# Patient Record
Sex: MALE | Race: WHITE | Employment: OTHER | URBAN - METROPOLITAN AREA
[De-identification: names, ages, dates, MRNs, and addresses within clinical notes are randomized per-mention and may not be internally consistent; named-entity substitution may affect disease eponyms.]

---

## 2017-11-26 ENCOUNTER — HOSPITAL ENCOUNTER (INPATIENT)
Age: 47
LOS: 3 days | Discharge: HOME | DRG: 885 | End: 2017-11-29
Attending: EMERGENCY MEDICINE | Admitting: PSYCHIATRY & NEUROLOGY
Payer: MEDICARE

## 2017-11-26 DIAGNOSIS — F30.9 MANIA (HCC): Primary | ICD-10-CM

## 2017-11-26 PROBLEM — F33.9 DEPRESSION, MAJOR, RECURRENT (HCC): Status: ACTIVE | Noted: 2017-11-26

## 2017-11-26 LAB
AMPHETAMINE SCREEN URINE: NEGATIVE
BARBITURATE SCREEN URINE: NEGATIVE
BENZODIAZEPINE SCREEN, URINE: NEGATIVE
BILIRUBIN URINE: NEGATIVE
BUPRENORPHINE URINE: NORMAL
CANNABINOID SCREEN URINE: NEGATIVE
CHP ED QC CHECK: YES
COCAINE METABOLITE, URINE: NEGATIVE
COLOR: YELLOW
COMMENT UA: ABNORMAL
CREAT SERPL-MCNC: 0.74 MG/DL (ref 0.7–1.2)
GFR AFRICAN AMERICAN: >60 ML/MIN
GFR NON-AFRICAN AMERICAN: >60 ML/MIN
GFR SERPL CREATININE-BSD FRML MDRD: NORMAL ML/MIN/{1.73_M2}
GFR SERPL CREATININE-BSD FRML MDRD: NORMAL ML/MIN/{1.73_M2}
GLUCOSE BLD-MCNC: 354 MG/DL
GLUCOSE BLD-MCNC: 354 MG/DL (ref 75–110)
GLUCOSE URINE: ABNORMAL
KETONES, URINE: NEGATIVE
LEUKOCYTE ESTERASE, URINE: NEGATIVE
LITHIUM DATE LAST DOSE: ABNORMAL
LITHIUM DATE LAST DOSE: NORMAL
LITHIUM DOSE AMOUNT: ABNORMAL
LITHIUM DOSE AMOUNT: NORMAL
LITHIUM DOSE TIME: ABNORMAL
LITHIUM DOSE TIME: NORMAL
LITHIUM LEVEL: 0.5 MMOL/L (ref 0.6–1.2)
LITHIUM LEVEL: 0.7 MMOL/L (ref 0.6–1.2)
MDMA URINE: NORMAL
METHADONE SCREEN, URINE: NEGATIVE
METHAMPHETAMINE, URINE: NORMAL
NITRITE, URINE: NEGATIVE
OPIATES, URINE: NEGATIVE
OXYCODONE SCREEN URINE: NEGATIVE
PH UA: 6 (ref 5–8)
PHENCYCLIDINE, URINE: NEGATIVE
PROPOXYPHENE, URINE: NORMAL
PROTEIN UA: NEGATIVE
SPECIFIC GRAVITY UA: 1.03 (ref 1–1.03)
TEST INFORMATION: NORMAL
THYROXINE, FREE: 1.26 NG/DL (ref 0.93–1.7)
TRICYCLIC ANTIDEPRESSANTS, UR: NORMAL
TSH SERPL DL<=0.05 MIU/L-ACNC: 0.83 MIU/L (ref 0.3–5)
TURBIDITY: CLEAR
URINE HGB: NEGATIVE
UROBILINOGEN, URINE: NORMAL

## 2017-11-26 PROCEDURE — 81003 URINALYSIS AUTO W/O SCOPE: CPT

## 2017-11-26 PROCEDURE — 80178 ASSAY OF LITHIUM: CPT

## 2017-11-26 PROCEDURE — 6370000000 HC RX 637 (ALT 250 FOR IP): Performed by: PSYCHIATRY & NEUROLOGY

## 2017-11-26 PROCEDURE — 6370000000 HC RX 637 (ALT 250 FOR IP): Performed by: EMERGENCY MEDICINE

## 2017-11-26 PROCEDURE — 6360000002 HC RX W HCPCS

## 2017-11-26 PROCEDURE — 84439 ASSAY OF FREE THYROXINE: CPT

## 2017-11-26 PROCEDURE — 99285 EMERGENCY DEPT VISIT HI MDM: CPT

## 2017-11-26 PROCEDURE — 96372 THER/PROPH/DIAG INJ SC/IM: CPT

## 2017-11-26 PROCEDURE — 36415 COLL VENOUS BLD VENIPUNCTURE: CPT

## 2017-11-26 PROCEDURE — 82565 ASSAY OF CREATININE: CPT

## 2017-11-26 PROCEDURE — 84443 ASSAY THYROID STIM HORMONE: CPT

## 2017-11-26 PROCEDURE — 80307 DRUG TEST PRSMV CHEM ANLYZR: CPT

## 2017-11-26 PROCEDURE — 1240000000 HC EMOTIONAL WELLNESS R&B

## 2017-11-26 PROCEDURE — 82947 ASSAY GLUCOSE BLOOD QUANT: CPT

## 2017-11-26 RX ORDER — BENZTROPINE MESYLATE 1 MG/ML
2 INJECTION INTRAMUSCULAR; INTRAVENOUS 2 TIMES DAILY PRN
Status: DISCONTINUED | OUTPATIENT
Start: 2017-11-26 | End: 2017-11-29 | Stop reason: HOSPADM

## 2017-11-26 RX ORDER — DOXEPIN HYDROCHLORIDE 25 MG/1
25 CAPSULE ORAL NIGHTLY
Status: DISCONTINUED | OUTPATIENT
Start: 2017-11-26 | End: 2017-11-29 | Stop reason: HOSPADM

## 2017-11-26 RX ORDER — SIMVASTATIN 20 MG
10 TABLET ORAL NIGHTLY
Status: DISCONTINUED | OUTPATIENT
Start: 2017-11-26 | End: 2017-11-29 | Stop reason: HOSPADM

## 2017-11-26 RX ORDER — IBUPROFEN 400 MG/1
400 TABLET ORAL EVERY 6 HOURS PRN
Status: DISCONTINUED | OUTPATIENT
Start: 2017-11-26 | End: 2017-11-29 | Stop reason: HOSPADM

## 2017-11-26 RX ORDER — GEMFIBROZIL 600 MG/1
600 TABLET, FILM COATED ORAL 2 TIMES DAILY
Status: DISCONTINUED | OUTPATIENT
Start: 2017-11-26 | End: 2017-11-29 | Stop reason: HOSPADM

## 2017-11-26 RX ORDER — ACETAMINOPHEN 325 MG/1
650 TABLET ORAL EVERY 4 HOURS PRN
Status: DISCONTINUED | OUTPATIENT
Start: 2017-11-26 | End: 2017-11-29 | Stop reason: HOSPADM

## 2017-11-26 RX ORDER — HALOPERIDOL 5 MG/ML
INJECTION INTRAMUSCULAR
Status: COMPLETED
Start: 2017-11-26 | End: 2017-11-26

## 2017-11-26 RX ORDER — GEMFIBROZIL 600 MG/1
600 TABLET, FILM COATED ORAL 2 TIMES DAILY
COMMUNITY
End: 2018-01-01

## 2017-11-26 RX ORDER — HALOPERIDOL 5 MG/ML
10 INJECTION INTRAMUSCULAR ONCE
Status: COMPLETED | OUTPATIENT
Start: 2017-11-26 | End: 2017-11-26

## 2017-11-26 RX ORDER — LOVASTATIN 20 MG/1
20 TABLET ORAL DAILY
Status: DISCONTINUED | OUTPATIENT
Start: 2017-11-26 | End: 2017-11-26 | Stop reason: CLARIF

## 2017-11-26 RX ORDER — GLIPIZIDE 5 MG/1
5 TABLET ORAL DAILY
Status: DISCONTINUED | OUTPATIENT
Start: 2017-11-26 | End: 2017-11-29 | Stop reason: HOSPADM

## 2017-11-26 RX ORDER — LOSARTAN POTASSIUM 50 MG/1
50 TABLET ORAL DAILY
Status: DISCONTINUED | OUTPATIENT
Start: 2017-11-26 | End: 2017-11-29 | Stop reason: HOSPADM

## 2017-11-26 RX ORDER — LOSARTAN POTASSIUM 50 MG/1
50 TABLET ORAL DAILY
Status: ON HOLD | COMMUNITY
End: 2018-01-19 | Stop reason: HOSPADM

## 2017-11-26 RX ORDER — DOXEPIN HYDROCHLORIDE 25 MG/1
25 CAPSULE ORAL NIGHTLY
COMMUNITY
End: 2018-01-01

## 2017-11-26 RX ORDER — LOVASTATIN 20 MG/1
20 TABLET ORAL DAILY
COMMUNITY

## 2017-11-26 RX ORDER — GLIPIZIDE 5 MG/1
5 TABLET ORAL DAILY
Status: ON HOLD | COMMUNITY
End: 2018-01-19 | Stop reason: HOSPADM

## 2017-11-26 RX ORDER — TRAZODONE HYDROCHLORIDE 100 MG/1
100 TABLET ORAL NIGHTLY PRN
Status: DISCONTINUED | OUTPATIENT
Start: 2017-11-26 | End: 2017-11-29 | Stop reason: HOSPADM

## 2017-11-26 RX ORDER — LITHIUM CARBONATE 300 MG/1
600 TABLET, FILM COATED, EXTENDED RELEASE ORAL EVERY 12 HOURS SCHEDULED
Status: DISCONTINUED | OUTPATIENT
Start: 2017-11-26 | End: 2017-11-29 | Stop reason: HOSPADM

## 2017-11-26 RX ADMIN — METFORMIN HYDROCHLORIDE 1000 MG: 1000 TABLET, FILM COATED ORAL at 03:16

## 2017-11-26 RX ADMIN — GLIPIZIDE 5 MG: 5 TABLET ORAL at 10:53

## 2017-11-26 RX ADMIN — HALOPERIDOL 10 MG: 5 INJECTION INTRAMUSCULAR at 02:02

## 2017-11-26 RX ADMIN — SIMVASTATIN 10 MG: 20 TABLET, FILM COATED ORAL at 21:09

## 2017-11-26 RX ADMIN — GEMFIBROZIL 600 MG: 600 TABLET ORAL at 21:12

## 2017-11-26 RX ADMIN — METFORMIN HYDROCHLORIDE 1000 MG: 500 TABLET, FILM COATED ORAL at 17:44

## 2017-11-26 RX ADMIN — HALOPERIDOL LACTATE 10 MG: 5 INJECTION, SOLUTION INTRAMUSCULAR at 02:02

## 2017-11-26 RX ADMIN — LITHIUM CARBONATE 600 MG: 300 TABLET, FILM COATED, EXTENDED RELEASE ORAL at 10:53

## 2017-11-26 RX ADMIN — LITHIUM CARBONATE 600 MG: 300 TABLET, FILM COATED, EXTENDED RELEASE ORAL at 21:09

## 2017-11-26 RX ADMIN — GEMFIBROZIL 600 MG: 600 TABLET ORAL at 10:53

## 2017-11-26 RX ADMIN — LOSARTAN POTASSIUM 50 MG: 50 TABLET, FILM COATED ORAL at 10:53

## 2017-11-26 RX ADMIN — DOXEPIN HYDROCHLORIDE 25 MG: 25 CAPSULE ORAL at 21:09

## 2017-11-26 RX ADMIN — METFORMIN HYDROCHLORIDE 1000 MG: 500 TABLET, FILM COATED ORAL at 10:53

## 2017-11-26 ASSESSMENT — SLEEP AND FATIGUE QUESTIONNAIRES
AVERAGE NUMBER OF SLEEP HOURS: 0
SLEEP PATTERN: INSOMNIA
DO YOU USE A SLEEP AID: NO
DIFFICULTY ARISING: NO
DO YOU USE A SLEEP AID: YES
DIFFICULTY FALLING ASLEEP: YES
SLEEP PATTERN: INSOMNIA
DO YOU HAVE DIFFICULTY SLEEPING: YES
DIFFICULTY FALLING ASLEEP: YES
RESTFUL SLEEP: NO
DO YOU HAVE DIFFICULTY SLEEPING: YES
RESTFUL SLEEP: NO
DIFFICULTY STAYING ASLEEP: YES
DIFFICULTY STAYING ASLEEP: YES
DIFFICULTY ARISING: NO

## 2017-11-26 ASSESSMENT — LIFESTYLE VARIABLES
HISTORY_ALCOHOL_USE: NO

## 2017-11-26 ASSESSMENT — PAIN DESCRIPTION - PAIN TYPE: TYPE: CHRONIC PAIN

## 2017-11-26 ASSESSMENT — PAIN DESCRIPTION - LOCATION: LOCATION: BACK;FOOT

## 2017-11-26 ASSESSMENT — PAIN SCALES - GENERAL: PAINLEVEL_OUTOF10: 9

## 2017-11-26 ASSESSMENT — PATIENT HEALTH QUESTIONNAIRE - PHQ9: SUM OF ALL RESPONSES TO PHQ QUESTIONS 1-9: 12

## 2017-11-26 NOTE — PLAN OF CARE
Problem: General Medical Problem-Behavioral Health  Goal: LTG-Able to verbalize understanding of medical condition and treatments  Outcome: Ongoing  Pt did not participate in Relapse Prevention Plan Group at 1330 despite staff encouragement.

## 2017-11-26 NOTE — ED NOTES
Report given to Hansa Soto in [facility]. Questions answered.       Alejandro Segundo RN  11/26/17 4378

## 2017-11-26 NOTE — ED NOTES
Provisional Diagnosis:   Patient has a history of Bi-polar 1 Disorder    Psychosocial and Contextual Factors:   Patient lives in a group however, he does not want to continue to live there    C-SSRS Summary:      Patient: X  Family:   Agency: X(EPIC)      Present Suicidal Behavior:      Verbal: Patient denies     Attempt: Patient denies     Past Suicidal Behavior:     Verbal: Unable to assess    Attempt: Unable to assess      Self-Injurious/Self-Mutilation: Unable to assess    Trauma Identified:Unable to assess        Protective Factors:  Patient is linked to [facility]. Patient has social security income. Patient has Medicare      Risk Factors:  Patient has had previous psychiatric hospitalization. Clinical Summary:  Patient is a 54year old  male who was brought to the [facility] emergency room on a voluntary status by Susan Purvis. Patient reports that he was just getting out of the shower when the police were banging on the door. Patient presents with pressured speech,and rapid, compulsive talking. Patient is also acting out scenes. Patient's mood in elevated. Patient has flight of ideas with racing thoughts. Patient reports that he lives in [facility] and that the person that is supposed to given him his medications does not give him his medication that way they should. Patient reports that he takes Lithium. Level of Care Disposition: Writer consulted with Dr. Kate Osorio. Patient to be admitted to the [facility] for safety and stabilization.

## 2017-11-26 NOTE — PROGRESS NOTES
Psychiatric Admission and evaluation Note      Patient was admitted through the ER with complaints of increasing depression and suicidal ideation. Chief Complaints: Depressed, allegedly suicidal  HPI: Patient is a 41 y.o. male who complains of excessive worry, agitation, fearfulness, labile mood, insomnia and depression. He also complains of anhedonia, difficulty concentrating, fatigue, feelings of worthlessness/guilt, hopelessness and insomnia. Onset was approximately a few weeks ago, rapidly worsening since that time. Intensity of depression rated at a 7/10 and denies any mitigating factors. Review of Systems   Constitutional: Negative for chills, fever and weight loss. Neurological: Negative for focal weakness, seizures and loss of consciousness. Psychiatric/Behavioral: Positive for depression and suicidal ideas. All other systems reviewed and are negative. Past Medical and Psychiatric History:        Diagnosis Date    Bipolar 1 disorder ([facility]     Diabetes mellitus ([facility]     Hyperlipidemia       Psychiatric hospitalizations, Depression, Anhedonia and Mena/Hypomania    Social History:    Education Status: high school diploma/ged    Employment History: Unemployed, not seeking work    Relationships: Single Children: No     Substance use: none    Family History:    No Family History  Family is not active in patient's care.         Vitals:    11/26/17 0828   BP: 128/64   Pulse: 78   Resp: 14   Temp: 98.1 °F (36.7 °C)   SpO2:      Results for orders placed or performed during the hospital encounter of 11/26/17   LITHIUM LEVEL   Result Value Ref Range    Lithium Lvl 0.7 0.6 - 1.2 mmol/L    Lithium Dose Amount UNKNOWN     Lithium Date Last Dose UNKNOWN     Lithium Dose Time UNKNOWN    URINE DRUG SCREEN   Result Value Ref Range    Amphetamine Screen, Ur NEGATIVE NEG    Barbiturate Screen, Ur NEGATIVE NEG    Benzodiazepine Screen, Urine NEGATIVE NEG    Cocaine Recurrent depression  Tx plan: Safe therapeutic milieu  Medications as listed  H&P  Labs  Encourage to attend groups  Supportive therapy  ELOS: 5-7 days

## 2017-11-26 NOTE — PLAN OF CARE
Problem: Altered Mood, Manic Behavior  Goal: LTG-Able to sleep  Outcome: Ongoing  Pt.denies SI/HI/Av at this time. Q 15 min checks maintained for safety. Pt.admits to depression and anxiety. Pt.encouraged to explore coping skills for anxiety.

## 2017-11-26 NOTE — BH NOTE
`[facility]al [facility] [place]  Admission Note     Admission Type:   Admission Type: Voluntary    Reason for admission:  Reason for Admission: PT states he has not been recieving his medications correctly at group home, poor complians of poor sleep, racing thoughts, manic behaviors     PATIENT STRENGTHS:  Strengths: Communication, Connection to output provider    Patient Strengths and Limitations:  Limitations: Difficulty problem solving/relies on others to help solve problems, Lacks leisure interests    Addictive Behavior:   Addictive Behavior  In the past 3 months, have you felt or has someone told you that you have a problem with:  : None  Do you have a history of Chemical Use?: No  Do you have a history of Alcohol Use?: No  Do you have a history of Street Drug Abuse?: No  Histroy of Prescripton Drug Abuse?: No    Medical Problems:   Past Medical History:   Diagnosis Date    [facility]ipolar [facility] disorder ([facility]     Diabetes mellitus ([facility]     Hyperlipidemia        Status EXAM:  Status and Exam  Normal: No  Facial Expression: Exaggerated  Affect: Unstable  Level of Consciousness: Alert  Mood:Normal: No  Mood: Anxious  Motor Activity:Normal: Yes  Interview Behavior: Cooperative, Evasive  Preception: [place] to Person, Gaylin Maser to Time, [place] to Place  Attention:Normal: No  Attention: Distractible, Unable to Concentrate  Thought Processes: Flt.of Ideas, Tangential  Thought Content:Normal: No  Thought Content: Preoccupations  Hallucinations: None  Delusions: No  Memory:Normal: No  Memory: Poor Recent, Poor Remote  Insight and Judgment: No  Insight and Judgment: Poor Judgment, Poor Insight, Unrealistic  Present Suicidal Ideation: No  Present Homicidal Ideation: No    Tobacco Screening:  Practical Counseling, on admission, karina X, if applicable and completed (first 3 are required if patient doesn't refuse):            ( )  Recognizing danger situations (included triggers and roadblocks)                    ( )  Coping skills (new ways to manage stress, exercise, relaxation techniques, changing routine, distraction)                                                           ( )  Basic information about quitting (benefits of quitting, techniques in how to quit, available resources  ( ) Referral for counseling faxed to Billy                                           ( ) Patient refused counseling  (X ) Patient has not smoked in the last 30 days      Pt admitted with followings belongings:  Dentures: None  Vision - Corrective Lenses: Glasses  Hearing Aid: None  Body Piercings Removed: N/A  Clothing: Other (Comment)  Were All Patient Medications Collected?: Other (comment)  Other Valuables: Other (Comment)     Valuables sent home with NA. Valuables placed in safe in security envelope, number:  NA. Patient's home medications will be verified by [facility]. Patient oriented to surroundings and program expectations and copy of patient rights given. Received admission packet:  yes. Consents reviewed, signed yes. Refused NA. Patient verbalize understanding:  yes. Patient education on precautions: yes    Pt states on admission that his room in group home is covered in bed bugs. Belongings bagged upon admission.                     Reinaldo Gonzalez RN

## 2017-11-26 NOTE — ED PROVIDER NOTES
are interpreted by the Emergency Department Physician who either signs or Co-signs this chart in the absence of a cardiologist.      RADIOLOGY:All plain film, CT, MRI, and formal ultrasound images (except ED bedside ultrasound) are read by the radiologist and interpretations are viewed by the emergency physician. No orders to display     LABS: All lab results were reviewed by myself, and all abnormals are listed below.   Labs Reviewed   URINALYSIS - Abnormal; Notable for the following:        Result Value    Glucose, Ur 3+ (*)     All other components within normal limits   LITHIUM LEVEL - Abnormal; Notable for the following:     Lithium Lvl 0.5 (*)     All other components within normal limits   POC GLUCOSE FINGERSTICK - Abnormal; Notable for the following:     POC Glucose 354 (*)     All other components within normal limits   POCT GLUCOSE - Normal   LITHIUM LEVEL   URINE DRUG SCREEN   TSH WITHOUT REFLEX   T4, FREE   CREATININE, SERUM     EMERGENCY DEPARTMENT COURSE:     Vitals:    Vitals:    11/26/17 0114 11/26/17 0418 11/26/17 0828 11/26/17 1936   BP: (!) 178/111 (!) 156/101 128/64 137/85   Pulse: 102 100 78 101   Resp: 18 14 14 14   Temp: 98.9 °F (37.2 °C) 98.1 °F (36.7 °C) 98.1 °F (36.7 °C) 98.1 °F (36.7 °C)   TempSrc: Oral Oral Oral Oral   SpO2: 97%      Weight: 255 lb (115.7 kg) 255 lb (115.7 kg)     Height: 5' 11\" (1.803 m) 5' 11\" (1.803 m)       The patient was given the following medications while in the emergency department:  Orders Placed This Encounter   Medications    haloperidol lactate (HALDOL) injection 10 mg    haloperidol lactate (HALDOL) 5 MG/ML injection     ADRIEL DAVIS: cabinet override    metFORMIN (GLUCOPHAGE) tablet 1,000 mg    acetaminophen (TYLENOL) tablet 650 mg    ibuprofen (ADVIL;MOTRIN) tablet 400 mg    traZODone (DESYREL) tablet 100 mg    benztropine mesylate (COGENTIN) injection 2 mg    magnesium hydroxide (MILK OF MAGNESIA) 400 MG/5ML suspension 30 mL    doxepin (SINEQUAN) capsule 25 mg    lithium (LITHOBID) extended release tablet 600 mg    gemfibrozil (LOPID) tablet 600 mg    losartan (COZAAR) tablet 50 mg    glipiZIDE (GLUCOTROL) tablet 5 mg    DISCONTD: lovastatin (MEVACOR) tablet 20 mg     Order Specific Question:   Please select a reason the therapeutic interchange was not accepted. If you did not receive the alert pop-up, patient has a medication order that is contraindicated with simvastatin:     Answer:   Risk of Drug-Drug Interactions    metFORMIN (GLUCOPHAGE) tablet 1,000 mg    simvastatin (ZOCOR) tablet 10 mg     CONSULTS:  IP CONSULT TO HOSPITALIST    FINAL IMPRESSION      1.  [facility])          DISPOSITION/PLAN   DISPOSITION Admitted    PATIENT REFERRED TO:  Philomena Beasley, [address] [place] [address]  [phone]          DISCHARGE MEDICATIONS:  Current Discharge Medication List        Jim Nails MD  Attending Emergency Physician                      Jim Nails MD  11/26/17 6168

## 2017-11-26 NOTE — CARE COORDINATION
Writer attempted to meet with pt but pt was in the shower. Will continue to try and meet with pt to complete psychosocial assessment.

## 2017-11-26 NOTE — PLAN OF CARE
Problem: General Medical Problem-Behavioral Health  Goal: LTG-Able to verbalize understanding of medical condition and treatments  Outcome: Ongoing  Pt did not participate in Goal Setting and Community Meeting at [employer] despite staff encouragement.

## 2017-11-26 NOTE — PLAN OF CARE
Problem: Altered Mood, Manic Behavior  Goal: LTG-Able to sleep  Outcome: Ongoing  Pt declined to attend psychoeducation at 10:00am despite encouragement from staff. Pt offered 1:1. Writer met with pt 1:1 to complete assessment.

## 2017-11-27 LAB
EKG ATRIAL RATE: 84 BPM
EKG P AXIS: 40 DEGREES
EKG P-R INTERVAL: 178 MS
EKG Q-T INTERVAL: 380 MS
EKG QRS DURATION: 132 MS
EKG QTC CALCULATION (BAZETT): 449 MS
EKG R AXIS: -42 DEGREES
EKG T AXIS: 27 DEGREES
EKG VENTRICULAR RATE: 84 BPM

## 2017-11-27 PROCEDURE — 6370000000 HC RX 637 (ALT 250 FOR IP): Performed by: PSYCHIATRY & NEUROLOGY

## 2017-11-27 PROCEDURE — 1240000000 HC EMOTIONAL WELLNESS R&B

## 2017-11-27 PROCEDURE — 93005 ELECTROCARDIOGRAM TRACING: CPT

## 2017-11-27 RX ADMIN — GLIPIZIDE 5 MG: 5 TABLET ORAL at 08:44

## 2017-11-27 RX ADMIN — METFORMIN HYDROCHLORIDE 1000 MG: 500 TABLET, FILM COATED ORAL at 08:44

## 2017-11-27 RX ADMIN — GEMFIBROZIL 600 MG: 600 TABLET ORAL at 08:44

## 2017-11-27 RX ADMIN — LITHIUM CARBONATE 600 MG: 300 TABLET, FILM COATED, EXTENDED RELEASE ORAL at 21:28

## 2017-11-27 RX ADMIN — METFORMIN HYDROCHLORIDE 1000 MG: 500 TABLET, FILM COATED ORAL at 17:16

## 2017-11-27 RX ADMIN — LITHIUM CARBONATE 600 MG: 300 TABLET, FILM COATED, EXTENDED RELEASE ORAL at 08:44

## 2017-11-27 RX ADMIN — GEMFIBROZIL 600 MG: 600 TABLET ORAL at 21:28

## 2017-11-27 RX ADMIN — LOSARTAN POTASSIUM 50 MG: 50 TABLET, FILM COATED ORAL at 08:44

## 2017-11-27 RX ADMIN — SIMVASTATIN 10 MG: 20 TABLET, FILM COATED ORAL at 21:28

## 2017-11-27 RX ADMIN — DOXEPIN HYDROCHLORIDE 25 MG: 25 CAPSULE ORAL at 21:28

## 2017-11-27 ASSESSMENT — PAIN DESCRIPTION - PAIN TYPE
TYPE: CHRONIC PAIN
TYPE: CHRONIC PAIN

## 2017-11-27 ASSESSMENT — PAIN DESCRIPTION - LOCATION
LOCATION: BACK;FOOT
LOCATION: BACK

## 2017-11-27 ASSESSMENT — PAIN DESCRIPTION - ORIENTATION: ORIENTATION: RIGHT;LEFT

## 2017-11-27 ASSESSMENT — PAIN SCALES - GENERAL
PAINLEVEL_OUTOF10: 7
PAINLEVEL_OUTOF10: 10

## 2017-11-27 NOTE — PLAN OF CARE
Problem: Altered Mood, Manic Behavior  Goal: LTG-Able to verbalize decrease in frequency and intensity of racing thoughts  Outcome: Ongoing  Pt relates that he is feeling and doing better, however, he has racing thoughts, flight of ideas, loose thoughts, loud/pressured speech. Complaintive, yells out at times. Does take meds as ordered. Does not attend groups.

## 2017-11-27 NOTE — PLAN OF CARE
Problem: Altered Mood, Manic Behavior  Goal: LTG-Able to verbalize decrease in frequency and intensity of racing thoughts  Outcome: Ongoing  Pt did not participate in Creative Expression / Stress and Relaxation / Positive Coping Skills group at 1430 despite staff encouragement.

## 2017-11-27 NOTE — PLAN OF CARE
No  Memory:Normal: No  Memory: Poor Recent, Poor Remote  Insight and Judgment: No  Insight and Judgment: Poor Judgment, Poor Insight, Unrealistic  Present Suicidal Ideation: No  Present Homicidal Ideation: No    Daily Assessment Last Entry:   Daily Sleep (WDL): Within Defined Limits         Patient Currently in Pain: Yes  Daily Nutrition (WDL): Within Defined Limits    Patient Monitoring:  Frequency of Checks: 4 times per hour, close    Psychiatric Symptoms:   Depression Symptoms  Depression Symptoms: Impaired concentration  Anxiety Symptoms  Anxiety Symptoms: Generalized  Mena Symptoms  Mena Symptoms: Flight of ideas, Increased energy, Poor judgment     Psychosis Symptoms  Delusion Type: Somatic    Suicide Risk CSSR-S:  1) Within the past month, have you wished you were dead or wished you could go to sleep and not wake up? : NO  2) Within the past month, have you actually had any thoughts of killing yourself? : NO  6)  Have you ever done anything, started to do anything, or prepared to do anything to end your life?: NO  Change in Result:  no Change in Plan of care:  no      EDUCATION:   Learner Progress Toward Treatment Goals:   Reviewed results and recommendations of this team, Reviewed group plan and strategies, Reviewed signs, symptoms and risk of self harm and violent behavior, Reviewed goals and plan of care    Method:  small group, individual verbal education    Outcome:   Verbalized by patient but needs reinforcement to obtain goals    PATIENT GOALS:  Short term:  Catch up on rest, manage headache, call . Long term:  Stay out of the hospital, follow up with Ian Naik.      PLAN/TREATMENT RECOMMENDATIONS UPDATE:  continue with group therapies, increased socialization, continue planning for after discharge goals, continue with medication compliance    SHORT-TERM GOALS UPDATE:   Time frame for Short-Term Goals:  5-7 days    LONG-TERM GOALS UPDATE:   Time frame for Long-Term Goals:  6 months    Members Present in Team Meeting:     ALLA Shane    Problem: General Medical Problem-Behavioral Health  Goal: LTG-Able to verbalize understanding of medical condition and treatments  Outcome: Ongoing    Goal: STG-Knowledge of dietary restrictions  Outcome: Ongoing      Problem: Pain:  Goal: Pain level will decrease  Pain level will decrease   Outcome: Ongoing    Goal: Control of acute pain  Control of acute pain   Outcome: Ongoing    Goal: Control of chronic pain  Control of chronic pain   Outcome: Ongoing

## 2017-11-27 NOTE — PLAN OF CARE
Problem: Altered Mood, Manic Behavior  Goal: LTG-Able to verbalize decrease in frequency and intensity of racing thoughts  Outcome: Ongoing  Pt did not participate in Leisure Skills and Awareness / Social Skills / Recalling group at 1000 despite staff encouragement.

## 2017-11-27 NOTE — PLAN OF CARE
Problem: Altered Mood, Manic Behavior  Goal: STG-Absence of Self Harm  Outcome: Ongoing  Pt denies any SI or any thoughts of self harm, and no self harming behaviors noted.

## 2017-11-28 PROCEDURE — 6370000000 HC RX 637 (ALT 250 FOR IP): Performed by: PSYCHIATRY & NEUROLOGY

## 2017-11-28 PROCEDURE — 1240000000 HC EMOTIONAL WELLNESS R&B

## 2017-11-28 RX ADMIN — METFORMIN HYDROCHLORIDE 1000 MG: 500 TABLET, FILM COATED ORAL at 17:29

## 2017-11-28 RX ADMIN — LITHIUM CARBONATE 600 MG: 300 TABLET, FILM COATED, EXTENDED RELEASE ORAL at 08:08

## 2017-11-28 RX ADMIN — GEMFIBROZIL 600 MG: 600 TABLET ORAL at 08:08

## 2017-11-28 RX ADMIN — LOSARTAN POTASSIUM 50 MG: 50 TABLET, FILM COATED ORAL at 08:08

## 2017-11-28 RX ADMIN — LITHIUM CARBONATE 600 MG: 300 TABLET, FILM COATED, EXTENDED RELEASE ORAL at 21:10

## 2017-11-28 RX ADMIN — DOXEPIN HYDROCHLORIDE 25 MG: 25 CAPSULE ORAL at 21:10

## 2017-11-28 RX ADMIN — SIMVASTATIN 20 MG: 20 TABLET, FILM COATED ORAL at 21:08

## 2017-11-28 RX ADMIN — GLIPIZIDE 5 MG: 5 TABLET ORAL at 08:08

## 2017-11-28 RX ADMIN — GEMFIBROZIL 600 MG: 600 TABLET ORAL at 21:10

## 2017-11-28 RX ADMIN — METFORMIN HYDROCHLORIDE 1000 MG: 500 TABLET, FILM COATED ORAL at 08:08

## 2017-11-28 ASSESSMENT — PAIN DESCRIPTION - ORIENTATION: ORIENTATION: RIGHT;LEFT

## 2017-11-28 ASSESSMENT — PAIN DESCRIPTION - LOCATION: LOCATION: BACK;FOOT

## 2017-11-28 ASSESSMENT — PAIN SCALES - GENERAL: PAINLEVEL_OUTOF10: 9

## 2017-11-28 NOTE — PLAN OF CARE
Problem: Altered Mood, Manic Behavior  Goal: LTG-Able to verbalize decrease in frequency and intensity of racing thoughts  Outcome: Ongoing  Psychoeducation Group Note    Date: 11/28/2017  Start Time: 1000  End Time: 1045    Number Participants in Group:  2    Goal:  Patient will demonstrate increased interpersonal interaction. Topic:  Creative Expression / Positive Coping Skills / Stress and Relaxation    Discipline Responsible:   OT  AT  [facility]. X RT  Other       Participation Level:     None  Minimal    Active Listener x Interactive    Monopolizing         Participation Quality:   Appropriate x Inappropriate          Attentive x       Intrusive   x       Sharing        Resistant          Supportive        Lethargic       Affective:    Congruent x Incongruent  Blunted  Flat    Constricted  Anxious  Elated  Angry    Labile  Depressed  Other  Bright       Cognitive:  x Alert  Oriented PPTP     Concentration  G x F  P   Attention Span  G x F  P   Short-Term Memory x G  F  P   Long-Term Memory  G  F  P   ProblemSolving/  Decision Making  G x F  P   Ability to Process  Information  G x F  P      Contributing Factors             Delusional             Hallucinating             Flight of Ideas             Other:       Modes of Intervention:  x Education x Support x Exploration   x Clarifying x Problem Solving x Confrontation   x Socialization x Limit Setting x Reality Testing   x Activity  Movement  Media    Other:            Response to Learning:  x Able to verbalize current knowledge/experience   x Able to verbalize/acknowledge new learning   x Able to retain information   x Capable of insight    Able to change behavior   x Progressing to goal    Other:        Comments: Patient needed frequent redirection due to interrupting group and talking about inappropriate topics.

## 2017-11-28 NOTE — PLAN OF CARE
Problem: Altered Mood, Manic Behavior  Goal: LTG-Able to verbalize decrease in frequency and intensity of racing thoughts  Outcome: Ongoing  Pt did not participate in Social Skills / Therapeutic Discussion / Communication group at 1330 despite staff encouragement.

## 2017-11-28 NOTE — PLAN OF CARE
Problem: Altered Mood, Manic Behavior  Goal: LTG-Able to verbalize decrease in frequency and intensity of racing thoughts  Outcome: Ongoing  Psychoeducation Group Note    Date: 11/28/2017  Start Time: 3352  End Time: 9530    Number Participants in Group:  5    Goal:  Patient will demonstrate increased interpersonal interaction. Topic:  Goal Setting / Community Meeting    Discipline Responsible:   OT  AT  [facility]. X RT  Other       Participation Level:     None  Minimal    Active Listener x Interactive   x Monopolizing         Participation Quality:   Appropriate x Inappropriate          Attentive x       Intrusive   x       Sharing        Resistant          Supportive        Lethargic       Affective:    Congruent x Incongruent  Blunted  Flat    Constricted  Anxious  Elated  Angry    Labile  Depressed  Other  Bright       Cognitive:  x Alert  Oriented PPTP     Concentration  G  F x P   Attention Span  G x F  P   Short-Term Memory  G x F  P   Long-Term Memory  G  F  P   ProblemSolving/  Decision Making  G  F x P   Ability to Process  Information  G x F  P      Contributing Factors             Delusional             Hallucinating   x          Flight of Ideas             Other:       Modes of Intervention:  x Education x Support x Exploration   x Clarifying x Problem Solving x Confrontation   x Socialization x Limit Setting x Reality Testing   x Activity  Movement  Media    Other:            Response to Learning:  x Able to verbalize current knowledge/experience   x Able to verbalize/acknowledge new learning   x Able to retain information   x Capable of insight    Able to change behavior   x Progressing to goal    Other:        Goal for today:  Talk with doctor about discharge. Patient was frequently in and out of group. Patient needed constant redirection due to being sexually inappropriate and monopolizing behavior. Patient responded minimally to redirection given and had poor focus.

## 2017-11-28 NOTE — PLAN OF CARE
Problem: Altered Mood, Manic Behavior  Goal: LTG-Able to sleep  Outcome: Ongoing  Pt. Complains of poor sleep. Tells staff he is more stressed out being here and can't sleep. Pt. Remains safe on the unit. Q 15 minute checks for safety maintained. Goal: LTG-Able to verbalize decrease in frequency and intensity of racing thoughts  Outcome: Ongoing  Pt. Has very loud and pressured speech. Intrusive with rapid speech and is on phone for long periods of time between groups. Pt. Is focused on getting discharged and says he is worse being here. Pt is med complaint. Denies hallucinations and denies thoughts to harm himself. Pt. Remains safe on the unit. Q 15 minute checks for safety maintained. Goal: STG-Absence of Self Harm  Outcome: Ongoing  Pt denies thoughts of self harm and is agreeable to seeking out should thoughts of self harm arise. Safe environment maintained. Q15 minute checks for safety cont per unit policy. Will cont to monitor for safety and provides support and reassurance as needed. Problem: General Medical Problem-Behavioral Health  Goal: LTG-Able to verbalize understanding of medical condition and treatments  Outcome: Ongoing  Pt. Is focused on getting his blood sugar tested and says he is being neglected here. At desk frequently. Remains loud and intrusive. Pt. Remains safe on the unit. Q 15 minute checks for safety maintained. Goal: STG-Knowledge of dietary restrictions  Outcome: Ongoing  Pt. Encouraged to maintain an appropriate diet. Pt. Remains safe on the unit. Q 15 minute checks for safety maintained.

## 2017-11-28 NOTE — PROGRESS NOTES
All charting done by behavioral [facility] reviewed by Electronically signed by Marilyn Batista RN on 11/27/2017 at 11:03 PM

## 2017-11-29 VITALS
HEART RATE: 93 BPM | OXYGEN SATURATION: 97 % | DIASTOLIC BLOOD PRESSURE: 84 MMHG | SYSTOLIC BLOOD PRESSURE: 138 MMHG | TEMPERATURE: 98.1 F | WEIGHT: 255 LBS | HEIGHT: 71 IN | BODY MASS INDEX: 35.7 KG/M2 | RESPIRATION RATE: 14 BRPM

## 2017-11-29 LAB
LITHIUM DATE LAST DOSE: NORMAL
LITHIUM DOSE AMOUNT: 600
LITHIUM DOSE TIME: 2110
LITHIUM LEVEL: 0.8 MMOL/L (ref 0.6–1.2)

## 2017-11-29 PROCEDURE — 36415 COLL VENOUS BLD VENIPUNCTURE: CPT

## 2017-11-29 PROCEDURE — 5130000000 HC BRIDGE APPOINTMENT

## 2017-11-29 PROCEDURE — 6370000000 HC RX 637 (ALT 250 FOR IP): Performed by: PSYCHIATRY & NEUROLOGY

## 2017-11-29 PROCEDURE — 80178 ASSAY OF LITHIUM: CPT

## 2017-11-29 RX ORDER — LITHIUM CARBONATE 300 MG/1
600 TABLET, FILM COATED, EXTENDED RELEASE ORAL EVERY 12 HOURS SCHEDULED
Qty: 60 TABLET | Refills: 0 | Status: ON HOLD | OUTPATIENT
Start: 2017-11-29 | End: 2018-01-19 | Stop reason: HOSPADM

## 2017-11-29 RX ADMIN — GLIPIZIDE 5 MG: 5 TABLET ORAL at 08:23

## 2017-11-29 RX ADMIN — IBUPROFEN 400 MG: 400 TABLET, FILM COATED ORAL at 14:23

## 2017-11-29 RX ADMIN — GEMFIBROZIL 600 MG: 600 TABLET ORAL at 08:24

## 2017-11-29 RX ADMIN — LOSARTAN POTASSIUM 50 MG: 50 TABLET, FILM COATED ORAL at 08:23

## 2017-11-29 RX ADMIN — LITHIUM CARBONATE 600 MG: 300 TABLET, FILM COATED, EXTENDED RELEASE ORAL at 08:23

## 2017-11-29 RX ADMIN — METFORMIN HYDROCHLORIDE 1000 MG: 500 TABLET, FILM COATED ORAL at 08:23

## 2017-11-29 ASSESSMENT — PAIN SCALES - GENERAL: PAINLEVEL_OUTOF10: 4

## 2017-11-29 NOTE — H&P
HISTORY and [address]       NAME:  Catalina Fischer  MRN: [id number]   YOB: 1970   Date: 11/29/2017   Age: 41 y.o. Gender: male     COMPLAINT AND PRESENT HISTORY:      Catalina Fischer is 41 y.o.,  male, admitted because of depression. Pt denies suicidal ideation. Pt denies any homicidal ideation. Pt denies a history of previous suicide attempts. Pt feels hopeless, helpless, worthless, lack of interest, loss of energy. Poor insight. Pt has poor sleep, loss of appetite, poor concentration, denies having poor memory. Pt denies any current auditory, visual or tactile hallucinations. Patient denies any current stressors. Patient denies any current alcohol or substance abuse. Patient lives at [facility]. Pt has been non compliant with his medications.     DIAGNOSTIC RESULTS   Labs:    U/A:  Lab Results   Component Value Date    COLORU YELLOW 11/26/2017    SPECGRAV 1.027 11/26/2017    LEUKOCYTESUR NEGATIVE 11/26/2017    GLUCOSEU 3+ 11/26/2017    GLUCOSEU 3+ 09/02/2011       PAST MEDICAL HISTORY     Past Medical History:   Diagnosis Date    Bipolar 1 disorder ([facility]     Diabetes mellitus ([facility]     Hyperlipidemia        Pt denies any history of hypertension, stroke, heart disease, COPD, Asthma, GERD, Cancer, Seizures,Thyroid disease, Kidney Disease, Hepatitis, TB.    SURGICAL HISTORY       Past Surgical History:   Procedure Laterality Date    TONSILLECTOMY         FAMILY HISTORY       Family History   Problem Relation Age of Onset    Bipolar Disorder Father     Mental Illness Paternal Grandmother        SOCIAL HISTORY       Social History     Social History    Marital status: Single     Spouse name: N/A    Number of children: N/A    Years of education: N/A     Social History Main Topics    Smoking status: Never Smoker    Smokeless tobacco: Never Used    Alcohol use No    Drug use: No    Sexual activity: Not Asked     Other Topics Concern    None APPEARANCE:  Kai Sotelo is 41 y.o.,  male, moderately obese, nourished, conscious, alert. Does not appear to be distress or pain at this time. SKIN:  Warm, dry, no cyanosis or jaundice. HEAD:  Normocephalic, atraumatic, no swelling or tenderness. Opening and closing mouth (CN V). EYES:  Pupils equal, reactive to light, Conjunctiva is clear, EOMs intact andrés (CN II, III, IV, VI), eyelids WNL. EARS:  No discharge, no marked hearing loss (CN VIII). NOSE:  No rhinorrhea, epistaxis or septal deformity. THROAT:  Not congested. No ulceration bleeding or discharge. NECK:  No stiffness, trachea central.  No palpable masses or L.N.      CHEST:  Symmetrical and equal on expansion. HEART:  Regular rate and rhythm. S1 > S2, No audible murmurs or gallops. LUNGS:  Equal on expansion, normal breath sounds. No adventitious sounds. ABDOMEN:  Obese. Soft on palpation. No localized tenderness. No guarding or rigidity. No palpable organomegaly. LYMPHATICS:  No palpable lymphadenopathy. LOCOMOTOR, BACK AND SPINE:  No tenderness or deformities. Pt able to rotate head and shrug shoulders (CN XI). EXTREMITIES:  2+ pedal edema bilateral.  Astrids sign negative. No discoloration or ulcerations. NEUROLOGIC:  The patient is conscious, alert, oriented, Gait and balance WNL. No apparent focal sensory deficits. No motor deficits, muscle strength equal Andrés. No facial droop (CN VII), tongue protrudes centrally (CN XII), no slurring of the speech (CN X). CN I and IX not tested.             PROVISIONAL DIAGNOSES:      Principal Problem:    Depression, major, recurrent ([facility]      Johnathan Traylor PA-C on 11/29/2017 at 2:31 PM

## 2017-11-29 NOTE — PLAN OF CARE
Problem: Altered Mood, Manic Behavior  Goal: LTG-Able to verbalize decrease in frequency and intensity of racing thoughts  Outcome: Ongoing  Therapeutic Recreation Refusal  Pt did not participate in Discharge Planning at 1330 despite staff encouragement.

## 2017-11-29 NOTE — PROGRESS NOTES
Pt did not attend Therapeutic Recreation at 1430 d/t resting in room despite staff invitation to attend.

## 2017-11-29 NOTE — CARE COORDINATION
GROUP HOME:    Discussed with both Claudette and Finn Parent; pt's concerns for missing medications. Both stated that pt was offered his medications but refused them. Both will work with pt on a system to ensure he does not miss any mediations. Discussion with Kaylee Ang regarding obtaining bubble packs for pt and switching pharmacies to ensure pt receives mediations. Pt open to working with Kaylee Ang and Sumanth Olivo on a solution regarding medications.

## 2017-11-29 NOTE — PLAN OF CARE
Problem: Altered Mood, Manic Behavior  Goal: LTG-Able to sleep  Outcome: Ongoing  Patient is hyperverbal and medication compliant. Patient denies suicidal ideations but reports slight feelings of depression. Patient is anxious, reports eating and sleeping well, maintains ADL's and has safety checks Q15min and at irregular intervals. Patient attends select groups and patient safety is maintained.   Goal: STG-Medication therapy compliance  Outcome: Ongoing    Goal: LTG-Able to verbalize decrease in frequency and intensity of racing thoughts  Outcome: Ongoing    Goal: STG-Absence of Self Harm  Outcome: Ongoing      Problem: General Medical Problem-Behavioral Health  Goal: LTG-Able to verbalize understanding of medical condition and treatments  Outcome: Ongoing    Goal: STG-Knowledge of dietary restrictions  Outcome: Ongoing

## 2017-11-29 NOTE — PLAN OF CARE
Problem: Altered Mood, Manic Behavior  Goal: LTG-Able to verbalize decrease in frequency and intensity of racing thoughts  Outcome: Ongoing  PSYCHOEDUCATION GROUP NOTE    Date: November 29, 2017  Start Time: 1000  End Time: 1045    Number Participants in Group:  4/8    Goal:  Patient will demonstrate increased interpersonal interaction   Topic: Self-Awareness/Social Skills     Discipline Responsible:   OT  AT    Ns. x RT MHP Other       Participation Level:     None  Minimal   x Active Listener x Interactive    Monopolizing         Participation Quality:  x Appropriate  Inappropriate          Attentive        Intrusive   x       Sharing        Resistant          Supportive        Lethargic       Affective:   x Congruent  Incongruent  Blunted  Flat    Constricted  Anxious  Elated  Angry    Labile  Depressed  Other         Cognitive:  x Alert x Oriented PPTP     Concentration  G x F  P   Attention Span  G x F  P   Short-Term Memory x G  F  P   Long-Term Memory x G  F  P   ProblemSolving/  Decision Making x G  F  P   Ability to Process  Information x G  F  P      Contributing Factors             Delusional             Hallucinating             Flight of Ideas             Other:       Modes of Intervention:   Education  Support  Exploration    Clarifying x Problem Solving  Confrontation   x Socialization  Limit Setting x Reality Testing   x Activity  Movement  Media    Other:            Response to Learning:   Able to verbalize current knowledge/experience    Able to verbalize/acknowledge new learning    Able to retain information    Capable of insight    Able to change behavior   x Progressing to goal    Other:        Comments:

## 2017-11-29 NOTE — BH NOTE
Patient given tobacco quitline number 00994735099 at this time, refusing to call at this time, states \" I just dont want to quit now\"- patient given information as to the dangers of long term tobacco use. Continue to reinforce the importance of tobacco cessation.

## 2017-11-29 NOTE — CARE COORDINATION
Writer met with pt to complete 1:1. Pt concerned about discharged, worried he will go back to group home and not receive medications. Writer attempted to reach out to group home to discuss; left message, awaiting call back. Pt presented as anxious, concerned about feet and needing to get them removed due to issues related to diabetes.

## 2018-01-01 ENCOUNTER — HOSPITAL ENCOUNTER (INPATIENT)
Age: 48
LOS: 21 days | Discharge: HOME | DRG: 885 | End: 2018-01-22
Attending: PSYCHIATRY & NEUROLOGY | Admitting: PSYCHIATRY & NEUROLOGY
Payer: MEDICARE

## 2018-01-01 ENCOUNTER — HOSPITAL ENCOUNTER (EMERGENCY)
Age: 48
Discharge: HOME | End: 2018-01-01
Attending: EMERGENCY MEDICINE
Payer: MEDICARE

## 2018-01-01 ENCOUNTER — HOSPITAL ENCOUNTER (EMERGENCY)
Age: 48
Discharge: PSYCHIATRIC OR SUBSTANCE USE FACILITY | End: 2018-01-01
Attending: EMERGENCY MEDICINE
Payer: MEDICARE

## 2018-01-01 VITALS
TEMPERATURE: 98.2 F | OXYGEN SATURATION: 97 % | DIASTOLIC BLOOD PRESSURE: 79 MMHG | HEART RATE: 96 BPM | SYSTOLIC BLOOD PRESSURE: 158 MMHG | WEIGHT: 250 LBS | BODY MASS INDEX: 35.79 KG/M2 | RESPIRATION RATE: 16 BRPM | HEIGHT: 70 IN

## 2018-01-01 VITALS
RESPIRATION RATE: 16 BRPM | HEIGHT: 71 IN | DIASTOLIC BLOOD PRESSURE: 102 MMHG | WEIGHT: 250 LBS | SYSTOLIC BLOOD PRESSURE: 158 MMHG | BODY MASS INDEX: 35 KG/M2 | TEMPERATURE: 98.2 F | OXYGEN SATURATION: 98 % | HEART RATE: 93 BPM

## 2018-01-01 DIAGNOSIS — F31.12 MODERATE BIPOLAR I DISORDER WITH MANIA AS CURRENT EPISODE (HCC): Primary | ICD-10-CM
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
## 2018-01-01 DIAGNOSIS — R45.1 AGITATION: ICD-10-CM

## 2018-01-01 DIAGNOSIS — R73.9 HYPERGLYCEMIA: ICD-10-CM

## 2018-01-01 DIAGNOSIS — I10 ESSENTIAL HYPERTENSION: ICD-10-CM

## 2018-01-01 PROBLEM — F31.9 BIPOLAR 1 DISORDER (HCC): Status: ACTIVE | Noted: 2018-01-01

## 2018-01-01 LAB
ALBUMIN SERPL-MCNC: 4.2 G/DL (ref 3.5–5.2)
ALBUMIN/GLOBULIN RATIO: ABNORMAL (ref 1–2.5)
ALP BLD-CCNC: 124 U/L (ref 40–129)
ALT SERPL-CCNC: 39 U/L (ref 5–41)
ANION GAP SERPL CALCULATED.3IONS-SCNC: 14 MMOL/L (ref 9–17)
AST SERPL-CCNC: 37 U/L
BILIRUB SERPL-MCNC: 0.39 MG/DL (ref 0.3–1.2)
BUN BLDV-MCNC: 9 MG/DL (ref 6–20)
BUN/CREAT BLD: ABNORMAL (ref 9–20)
CALCIUM SERPL-MCNC: 9.3 MG/DL (ref 8.6–10.4)
CHLORIDE BLD-SCNC: 99 MMOL/L (ref 98–107)
CHP ED QC CHECK: YES
CO2: 25 MMOL/L (ref 20–31)
CREAT SERPL-MCNC: 0.73 MG/DL (ref 0.7–1.2)
GFR AFRICAN AMERICAN: >60 ML/MIN
GFR NON-AFRICAN AMERICAN: >60 ML/MIN
GFR SERPL CREATININE-BSD FRML MDRD: ABNORMAL ML/MIN/{1.73_M2}
GFR SERPL CREATININE-BSD FRML MDRD: ABNORMAL ML/MIN/{1.73_M2}
GLUCOSE BLD-MCNC: 211 MG/DL (ref 75–110)
GLUCOSE BLD-MCNC: 218 MG/DL (ref 70–99)
GLUCOSE BLD-MCNC: 222 MG/DL
GLUCOSE BLD-MCNC: 222 MG/DL (ref 75–110)
HCT VFR BLD CALC: 40 % (ref 41–53)
HEMOGLOBIN: 13.2 G/DL (ref 13.5–17.5)
LITHIUM DATE LAST DOSE: ABNORMAL
LITHIUM DOSE AMOUNT: ABNORMAL
LITHIUM DOSE TIME: ABNORMAL
LITHIUM LEVEL: 0.1 MMOL/L (ref 0.6–1.2)
MCH RBC QN AUTO: 28.8 PG (ref 26–34)
MCHC RBC AUTO-ENTMCNC: 33 G/DL (ref 31–37)
MCV RBC AUTO: 87.3 FL (ref 80–100)
PDW BLD-RTO: 13.7 % (ref 11.5–14.9)
PLATELET # BLD: 459 K/UL (ref 150–450)
PMV BLD AUTO: 8.4 FL (ref 6–12)
POTASSIUM SERPL-SCNC: 3.8 MMOL/L (ref 3.7–5.3)
RBC # BLD: 4.58 M/UL (ref 4.5–5.9)
SODIUM BLD-SCNC: 138 MMOL/L (ref 135–144)
TOTAL PROTEIN: 7 G/DL (ref 6.4–8.3)
WBC # BLD: 13.9 K/UL (ref 3.5–11)

## 2018-01-01 PROCEDURE — 90471 IMMUNIZATION ADMIN: CPT | Performed by: EMERGENCY MEDICINE

## 2018-01-01 PROCEDURE — 36415 COLL VENOUS BLD VENIPUNCTURE: CPT

## 2018-01-01 PROCEDURE — 80053 COMPREHEN METABOLIC PANEL: CPT

## 2018-01-01 PROCEDURE — 6370000000 HC RX 637 (ALT 250 FOR IP): Performed by: PSYCHIATRY & NEUROLOGY

## 2018-01-01 PROCEDURE — 80178 ASSAY OF LITHIUM: CPT

## 2018-01-01 PROCEDURE — 90715 TDAP VACCINE 7 YRS/> IM: CPT | Performed by: EMERGENCY MEDICINE

## 2018-01-01 PROCEDURE — 6360000002 HC RX W HCPCS: Performed by: EMERGENCY MEDICINE

## 2018-01-01 PROCEDURE — 6370000000 HC RX 637 (ALT 250 FOR IP): Performed by: EMERGENCY MEDICINE

## 2018-01-01 PROCEDURE — 82947 ASSAY GLUCOSE BLOOD QUANT: CPT

## 2018-01-01 PROCEDURE — 1240000000 HC EMOTIONAL WELLNESS R&B

## 2018-01-01 PROCEDURE — 96372 THER/PROPH/DIAG INJ SC/IM: CPT

## 2018-01-01 PROCEDURE — 99284 EMERGENCY DEPT VISIT MOD MDM: CPT

## 2018-01-01 PROCEDURE — G0384 LEV 5 HOSP TYPE B ED VISIT: HCPCS

## 2018-01-01 PROCEDURE — 85027 COMPLETE CBC AUTOMATED: CPT

## 2018-01-01 RX ORDER — TRAZODONE HYDROCHLORIDE 50 MG/1
50 TABLET ORAL NIGHTLY PRN
Status: DISCONTINUED | OUTPATIENT
Start: 2018-01-01 | End: 2018-01-22 | Stop reason: HOSPADM

## 2018-01-01 RX ORDER — GEMFIBROZIL 600 MG/1
600 TABLET, FILM COATED ORAL 2 TIMES DAILY
Status: ON HOLD | COMMUNITY
End: 2018-01-19 | Stop reason: HOSPADM

## 2018-01-01 RX ORDER — GUAIFENESIN/DEXTROMETHORPHAN 100-10MG/5
10 SYRUP ORAL 3 TIMES DAILY PRN
Status: ACTIVE | OUTPATIENT
Start: 2018-01-01 | End: 2018-01-04

## 2018-01-01 RX ORDER — MAGNESIUM HYDROXIDE/ALUMINUM HYDROXICE/SIMETHICONE 120; 1200; 1200 MG/30ML; MG/30ML; MG/30ML
30 SUSPENSION ORAL 2 TIMES DAILY PRN
Status: DISCONTINUED | OUTPATIENT
Start: 2018-01-01 | End: 2018-01-22 | Stop reason: HOSPADM

## 2018-01-01 RX ORDER — LOVASTATIN 20 MG/1
20 TABLET ORAL EVERY EVENING
Status: DISCONTINUED | OUTPATIENT
Start: 2018-01-01 | End: 2018-01-01 | Stop reason: CLARIF

## 2018-01-01 RX ORDER — LORAZEPAM 2 MG/ML
2 INJECTION INTRAMUSCULAR ONCE
Status: DISCONTINUED | OUTPATIENT
Start: 2018-01-01 | End: 2018-01-01

## 2018-01-01 RX ORDER — LITHIUM CARBONATE 300 MG/1
600 TABLET, FILM COATED, EXTENDED RELEASE ORAL EVERY 12 HOURS SCHEDULED
Status: DISCONTINUED | OUTPATIENT
Start: 2018-01-01 | End: 2018-01-22 | Stop reason: HOSPADM

## 2018-01-01 RX ORDER — GLIPIZIDE 5 MG/1
5 TABLET ORAL DAILY
Status: DISCONTINUED | OUTPATIENT
Start: 2018-01-01 | End: 2018-01-22 | Stop reason: HOSPADM

## 2018-01-01 RX ORDER — LOPERAMIDE HYDROCHLORIDE 2 MG/1
2 CAPSULE ORAL 4 TIMES DAILY PRN
Status: DISCONTINUED | OUTPATIENT
Start: 2018-01-01 | End: 2018-01-22 | Stop reason: HOSPADM

## 2018-01-01 RX ORDER — HALOPERIDOL 5 MG/ML
5 INJECTION INTRAMUSCULAR 2 TIMES DAILY PRN
Status: DISCONTINUED | OUTPATIENT
Start: 2018-01-01 | End: 2018-01-22 | Stop reason: HOSPADM

## 2018-01-01 RX ORDER — BENZTROPINE MESYLATE 1 MG/ML
2 INJECTION INTRAMUSCULAR; INTRAVENOUS DAILY PRN
Status: DISCONTINUED | OUTPATIENT
Start: 2018-01-01 | End: 2018-01-22 | Stop reason: HOSPADM

## 2018-01-01 RX ORDER — GABAPENTIN 300 MG/1
300 CAPSULE ORAL ONCE
Status: COMPLETED | OUTPATIENT
Start: 2018-01-01 | End: 2018-01-01

## 2018-01-01 RX ORDER — LORAZEPAM 1 MG/1
1 TABLET ORAL EVERY 6 HOURS PRN
Status: DISCONTINUED | OUTPATIENT
Start: 2018-01-01 | End: 2018-01-22 | Stop reason: HOSPADM

## 2018-01-01 RX ORDER — ACETAMINOPHEN 325 MG/1
650 TABLET ORAL EVERY 6 HOURS PRN
Status: DISCONTINUED | OUTPATIENT
Start: 2018-01-01 | End: 2018-01-22 | Stop reason: HOSPADM

## 2018-01-01 RX ORDER — HALOPERIDOL 5 MG
5 TABLET ORAL 2 TIMES DAILY PRN
Status: DISCONTINUED | OUTPATIENT
Start: 2018-01-01 | End: 2018-01-22 | Stop reason: HOSPADM

## 2018-01-01 RX ORDER — SIMVASTATIN 20 MG
10 TABLET ORAL NIGHTLY
Status: DISCONTINUED | OUTPATIENT
Start: 2018-01-01 | End: 2018-01-22 | Stop reason: HOSPADM

## 2018-01-01 RX ORDER — LORAZEPAM 2 MG/1
2 TABLET ORAL ONCE
Status: DISCONTINUED | OUTPATIENT
Start: 2018-01-01 | End: 2018-01-01 | Stop reason: HOSPADM

## 2018-01-01 RX ORDER — GEMFIBROZIL 600 MG/1
600 TABLET, FILM COATED ORAL 2 TIMES DAILY
Status: DISCONTINUED | OUTPATIENT
Start: 2018-01-01 | End: 2018-01-22 | Stop reason: HOSPADM

## 2018-01-01 RX ORDER — LOSARTAN POTASSIUM 50 MG/1
50 TABLET ORAL DAILY
Status: DISCONTINUED | OUTPATIENT
Start: 2018-01-01 | End: 2018-01-22 | Stop reason: HOSPADM

## 2018-01-01 RX ORDER — HALOPERIDOL 5 MG/ML
5 INJECTION INTRAMUSCULAR ONCE
Status: COMPLETED | OUTPATIENT
Start: 2018-01-01 | End: 2018-01-01

## 2018-01-01 RX ADMIN — GEMFIBROZIL 600 MG: 600 TABLET ORAL at 21:12

## 2018-01-01 RX ADMIN — TETANUS TOXOID, REDUCED DIPHTHERIA TOXOID AND ACELLULAR PERTUSSIS VACCINE, ADSORBED 0.5 ML: 5; 2.5; 8; 8; 2.5 SUSPENSION INTRAMUSCULAR at 09:30

## 2018-01-01 RX ADMIN — GLIPIZIDE 5 MG: 5 TABLET ORAL at 17:40

## 2018-01-01 RX ADMIN — HALOPERIDOL LACTATE 5 MG: 5 INJECTION, SOLUTION INTRAMUSCULAR at 11:48

## 2018-01-01 RX ADMIN — SIMVASTATIN 10 MG: 20 TABLET, FILM COATED ORAL at 21:12

## 2018-01-01 RX ADMIN — MYCOPHENOLATE MOFETIL 300 MG: 500 TABLET ORAL at 12:46

## 2018-01-01 RX ADMIN — METFORMIN HYDROCHLORIDE 1000 MG: 500 TABLET, FILM COATED ORAL at 17:40

## 2018-01-01 RX ADMIN — LOSARTAN POTASSIUM 50 MG: 50 TABLET, FILM COATED ORAL at 17:40

## 2018-01-01 RX ADMIN — LITHIUM CARBONATE 600 MG: 300 TABLET, FILM COATED, EXTENDED RELEASE ORAL at 21:12

## 2018-01-01 ASSESSMENT — PAIN DESCRIPTION - PAIN TYPE
TYPE: ACUTE PAIN
TYPE: CHRONIC PAIN
TYPE: CHRONIC PAIN

## 2018-01-01 ASSESSMENT — SLEEP AND FATIGUE QUESTIONNAIRES
DO YOU HAVE DIFFICULTY SLEEPING: YES
DIFFICULTY FALLING ASLEEP: YES
DO YOU USE A SLEEP AID: NO
SLEEP PATTERN: DISTURBED/INTERRUPTED SLEEP;RESTLESSNESS
DIFFICULTY STAYING ASLEEP: YES
AVERAGE NUMBER OF SLEEP HOURS: 4
RESTFUL SLEEP: NO
DIFFICULTY ARISING: NO

## 2018-01-01 ASSESSMENT — ENCOUNTER SYMPTOMS
NAUSEA: 0
SHORTNESS OF BREATH: 0
COLOR CHANGE: 0
VOMITING: 0
SHORTNESS OF BREATH: 0
NAUSEA: 0
ABDOMINAL PAIN: 0
COUGH: 0
RHINORRHEA: 0
VOMITING: 0
BACK PAIN: 0
WHEEZING: 0
ABDOMINAL PAIN: 0

## 2018-01-01 ASSESSMENT — PAIN DESCRIPTION - ORIENTATION
ORIENTATION: RIGHT;LEFT

## 2018-01-01 ASSESSMENT — PATIENT HEALTH QUESTIONNAIRE - PHQ9: SUM OF ALL RESPONSES TO PHQ QUESTIONS 1-9: 10

## 2018-01-01 ASSESSMENT — PAIN DESCRIPTION - LOCATION
LOCATION: FOOT

## 2018-01-01 ASSESSMENT — PAIN DESCRIPTION - DESCRIPTORS
DESCRIPTORS: ACHING
DESCRIPTORS: ACHING

## 2018-01-01 ASSESSMENT — PAIN SCALES - GENERAL
PAINLEVEL_OUTOF10: 9
PAINLEVEL_OUTOF10: 9
PAINLEVEL_OUTOF10: 10

## 2018-01-01 ASSESSMENT — PAIN DESCRIPTION - FREQUENCY
FREQUENCY: CONTINUOUS
FREQUENCY: CONTINUOUS

## 2018-01-01 ASSESSMENT — LIFESTYLE VARIABLES: HISTORY_ALCOHOL_USE: NO
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
## 2018-01-01 NOTE — ED PROVIDER NOTES
[facility]     Emergency Department     Faculty Attestation    I performed a history and physical examination of the patient and discussed management with the resident. I have reviewed and agree with the residents findings including all diagnostic interpretations, and treatment plans as written. Any areas of disagreement are noted on the chart. I was personally present for the key portions of any procedures. I have documented in the chart those procedures where I was not present during the key portions. I have reviewed the emergency nurses triage note. I agree with the chief complaint, past medical history, past surgical history, allergies, medications, social and family history as documented unless otherwise noted below. Documentation of the HPI, Physical Exam and Medical Decision Making performed by scribnitesh is based on my personal performance of the HPI, PE and MDM. For Physician Assistant/ Nurse Practitioner cases/documentation I have personally evaluated this patient and have completed at least one if not all key elements of the E/M (history, physical exam, and MDM). Additional findings are as noted. 29-year-old male returning to ER. Patient would not leave waiting room. Social work reported group home would not accept patient back due to patient's aggressive nature towards others. Patient does complain of foot pain bilateral feet examined no indication of acute injury or infection. Neurovascularly intact. Plan consult psych      Pre-hypertension/Hypertension: The patient has been informed that they may have pre-hypertension or Hypertension based on a blood pressure reading in the emergency department.  I recommend that the patient call the primary care provider listed on their discharge instructions or a physician of their choice this week to arrange follow up for further evaluation of possible pre-hypertension or Hypertension.             [address], [place]  01/01/18 4085
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
## 2018-01-01 NOTE — ED NOTES
Pt arrived to ER with need for psych evaluation. Pt was discharge from ED and was sitting in waiting room,  stated pt was manic. Group home where pt lives is requesting psych evaluation due to behavior at group home and she is concerned. Pt is alert, nad, rr even and unlabored. Pt ambulated to room with steady gait. Pt denies suicidal or homicidal thoughts.  Pt updated     Alem Magana RN  01/01/18 8715

## 2018-01-01 NOTE — ED NOTES
Pt very irritated with staff, verbally aggressive and is threatening staff. Pt informed need to calm down, pt became more irritated and louder. Pt informed Haldol will help relaxing him, pt screaming at staff.       Ghislaine Beltrán RN  01/01/18 7134

## 2018-01-01 NOTE — ED NOTES
[employer] states patient's not a call upon release. Writer to meet with patient in waiting room.       SHAHEEN Benz, [place]  01/01/18 2639
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
## 2018-01-02 LAB
GLUCOSE BLD-MCNC: 150 MG/DL (ref 75–110)
GLUCOSE BLD-MCNC: 172 MG/DL (ref 75–110)
GLUCOSE BLD-MCNC: 87 MG/DL (ref 75–110)

## 2018-01-02 PROCEDURE — 6370000000 HC RX 637 (ALT 250 FOR IP): Performed by: PSYCHIATRY & NEUROLOGY

## 2018-01-02 PROCEDURE — 6360000002 HC RX W HCPCS: Performed by: PSYCHIATRY & NEUROLOGY

## 2018-01-02 PROCEDURE — 1240000000 HC EMOTIONAL WELLNESS R&B

## 2018-01-02 PROCEDURE — 82947 ASSAY GLUCOSE BLOOD QUANT: CPT

## 2018-01-02 RX ORDER — CLONAZEPAM 0.5 MG/1
0.5 TABLET ORAL 2 TIMES DAILY
Status: DISCONTINUED | OUTPATIENT
Start: 2018-01-02 | End: 2018-01-22 | Stop reason: HOSPADM

## 2018-01-02 RX ORDER — OLANZAPINE 7.5 MG/1
7.5 TABLET ORAL NIGHTLY
Status: DISCONTINUED | OUTPATIENT
Start: 2018-01-02 | End: 2018-01-22 | Stop reason: HOSPADM

## 2018-01-02 RX ADMIN — METFORMIN HYDROCHLORIDE 1000 MG: 500 TABLET, FILM COATED ORAL at 17:42

## 2018-01-02 RX ADMIN — GEMFIBROZIL 600 MG: 600 TABLET ORAL at 22:32

## 2018-01-02 RX ADMIN — LITHIUM CARBONATE 600 MG: 300 TABLET, FILM COATED, EXTENDED RELEASE ORAL at 09:03

## 2018-01-02 RX ADMIN — METFORMIN HYDROCHLORIDE 1000 MG: 500 TABLET, FILM COATED ORAL at 09:04

## 2018-01-02 RX ADMIN — HALOPERIDOL LACTATE 5 MG: 5 INJECTION, SOLUTION INTRAMUSCULAR at 09:39

## 2018-01-02 RX ADMIN — LITHIUM CARBONATE 600 MG: 300 TABLET, FILM COATED, EXTENDED RELEASE ORAL at 22:33

## 2018-01-02 RX ADMIN — GLIPIZIDE 5 MG: 5 TABLET ORAL at 09:04

## 2018-01-02 RX ADMIN — LORAZEPAM 1 MG: 1 TABLET ORAL at 09:03

## 2018-01-02 RX ADMIN — LOSARTAN POTASSIUM 50 MG: 50 TABLET, FILM COATED ORAL at 09:03

## 2018-01-02 RX ADMIN — GEMFIBROZIL 600 MG: 600 TABLET ORAL at 09:04

## 2018-01-02 RX ADMIN — OLANZAPINE 7.5 MG: 7.5 TABLET, FILM COATED ORAL at 22:32

## 2018-01-02 RX ADMIN — SIMVASTATIN 10 MG: 20 TABLET, FILM COATED ORAL at 22:33

## 2018-01-02 ASSESSMENT — PAIN DESCRIPTION - LOCATION
LOCATION: BACK;FOOT
LOCATION: BACK;FOOT

## 2018-01-02 ASSESSMENT — PAIN DESCRIPTION - ORIENTATION
ORIENTATION: RIGHT;LEFT
ORIENTATION: RIGHT;LEFT

## 2018-01-02 ASSESSMENT — PAIN SCALES - GENERAL
PAINLEVEL_OUTOF10: 6
PAINLEVEL_OUTOF10: 5

## 2018-01-02 ASSESSMENT — PAIN DESCRIPTION - PAIN TYPE: TYPE: ACUTE PAIN
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
## 2018-01-03 PROCEDURE — 1240000000 HC EMOTIONAL WELLNESS R&B

## 2018-01-03 PROCEDURE — 6370000000 HC RX 637 (ALT 250 FOR IP): Performed by: PSYCHIATRY & NEUROLOGY

## 2018-01-03 RX ADMIN — CLONAZEPAM 0.5 MG: 0.5 TABLET ORAL at 08:58

## 2018-01-03 RX ADMIN — GEMFIBROZIL 600 MG: 600 TABLET ORAL at 21:55

## 2018-01-03 RX ADMIN — LITHIUM CARBONATE 600 MG: 300 TABLET, FILM COATED, EXTENDED RELEASE ORAL at 21:55

## 2018-01-03 RX ADMIN — LITHIUM CARBONATE 600 MG: 300 TABLET, FILM COATED, EXTENDED RELEASE ORAL at 08:58

## 2018-01-03 RX ADMIN — OLANZAPINE 7.5 MG: 7.5 TABLET, FILM COATED ORAL at 21:55

## 2018-01-03 RX ADMIN — GLIPIZIDE 5 MG: 5 TABLET ORAL at 08:58

## 2018-01-03 RX ADMIN — GEMFIBROZIL 600 MG: 600 TABLET ORAL at 08:58

## 2018-01-03 RX ADMIN — SIMVASTATIN 10 MG: 20 TABLET, FILM COATED ORAL at 21:55

## 2018-01-03 RX ADMIN — LOSARTAN POTASSIUM 50 MG: 50 TABLET, FILM COATED ORAL at 08:58

## 2018-01-03 RX ADMIN — METFORMIN HYDROCHLORIDE 1000 MG: 500 TABLET, FILM COATED ORAL at 08:58

## 2018-01-03 RX ADMIN — METFORMIN HYDROCHLORIDE 1000 MG: 500 TABLET, FILM COATED ORAL at 18:10

## 2018-01-03 ASSESSMENT — PAIN DESCRIPTION - DESCRIPTORS: DESCRIPTORS: ACHING

## 2018-01-03 ASSESSMENT — PAIN DESCRIPTION - LOCATION: LOCATION: FOOT

## 2018-01-04 LAB
AMPHETAMINE SCREEN URINE: NEGATIVE
BARBITURATE SCREEN URINE: NEGATIVE
BENZODIAZEPINE SCREEN, URINE: NEGATIVE
BUPRENORPHINE URINE: NORMAL
CANNABINOID SCREEN URINE: NEGATIVE
COCAINE METABOLITE, URINE: NEGATIVE
MDMA URINE: NORMAL
METHADONE SCREEN, URINE: NEGATIVE
METHAMPHETAMINE, URINE: NORMAL
OPIATES, URINE: NEGATIVE
OXYCODONE SCREEN URINE: NEGATIVE
PHENCYCLIDINE, URINE: NEGATIVE
PROPOXYPHENE, URINE: NORMAL
TEST INFORMATION: NORMAL
TRICYCLIC ANTIDEPRESSANTS, UR: NORMAL

## 2018-01-04 PROCEDURE — 1240000000 HC EMOTIONAL WELLNESS R&B

## 2018-01-04 PROCEDURE — 6370000000 HC RX 637 (ALT 250 FOR IP): Performed by: PSYCHIATRY & NEUROLOGY

## 2018-01-04 PROCEDURE — 80307 DRUG TEST PRSMV CHEM ANLYZR: CPT

## 2018-01-04 RX ORDER — POLYVINYL ALCOHOL 14 MG/ML
1 SOLUTION/ DROPS OPHTHALMIC EVERY 6 HOURS PRN
Status: DISCONTINUED | OUTPATIENT
Start: 2018-01-04 | End: 2018-01-22 | Stop reason: HOSPADM

## 2018-01-04 RX ADMIN — METFORMIN HYDROCHLORIDE 1000 MG: 500 TABLET, FILM COATED ORAL at 08:42

## 2018-01-04 RX ADMIN — CLONAZEPAM 0.5 MG: 0.5 TABLET ORAL at 21:32

## 2018-01-04 RX ADMIN — GEMFIBROZIL 600 MG: 600 TABLET ORAL at 21:32

## 2018-01-04 RX ADMIN — METFORMIN HYDROCHLORIDE 1000 MG: 500 TABLET, FILM COATED ORAL at 17:45

## 2018-01-04 RX ADMIN — SIMVASTATIN 10 MG: 20 TABLET, FILM COATED ORAL at 21:32

## 2018-01-04 RX ADMIN — GEMFIBROZIL 600 MG: 600 TABLET ORAL at 08:42

## 2018-01-04 RX ADMIN — OLANZAPINE 7.5 MG: 7.5 TABLET, FILM COATED ORAL at 21:32

## 2018-01-04 RX ADMIN — CLONAZEPAM 0.5 MG: 0.5 TABLET ORAL at 08:42

## 2018-01-04 RX ADMIN — GLIPIZIDE 5 MG: 5 TABLET ORAL at 08:42

## 2018-01-04 RX ADMIN — LITHIUM CARBONATE 600 MG: 300 TABLET, FILM COATED, EXTENDED RELEASE ORAL at 21:32

## 2018-01-04 RX ADMIN — LOSARTAN POTASSIUM 50 MG: 50 TABLET, FILM COATED ORAL at 08:42

## 2018-01-04 RX ADMIN — LITHIUM CARBONATE 600 MG: 300 TABLET, FILM COATED, EXTENDED RELEASE ORAL at 08:43

## 2018-01-04 ASSESSMENT — PAIN SCALES - GENERAL: PAINLEVEL_OUTOF10: 6

## 2018-01-04 ASSESSMENT — PAIN DESCRIPTION - ORIENTATION: ORIENTATION: RIGHT;LEFT

## 2018-01-04 ASSESSMENT — PAIN DESCRIPTION - LOCATION: LOCATION: BACK;FOOT

## 2018-01-04 NOTE — DISCHARGE SUMMARY
Physician Discharge Summary     Patient ID:  Catalina Fischer  [id number]  76 y.o.  1970    Admit date: 11/26/2017    Discharge date and time: 11/29/2017  4:15 PM     Admitting Physician: Keith Rodriguez MD     Discharge Physician: Keith Rodriguez    Admission Diagnoses: Depression, major, recurrent ([facility] [F33.9]    Discharge Diagnoses: Recurrent depression    Admission Condition: poor    Discharged Condition: stable    Indication for Admission: Depressed, suicidal    Hospital Course: Patient was provided with a safe therapeutic milieu. He was encouraged to attend groups. Admission labs were done and an H&P was obtained. He was started on medications that are listed below and provided with supportive and insight oriented therapy. He started showing steady improvement in symptoms. Sleep and appetite improved. He started participating in groups and other unit activities became more optimistic about the future and consistently and emphatically denied any suicidal ideations or intent. He started verbalizing a plan to keep self safe and follow through with out patient care.  He was discharged to outpatient care              Consults: none    Significant Diagnostic Studies:   Results for orders placed or performed during the hospital encounter of 11/26/17   LITHIUM LEVEL   Result Value Ref Range    Lithium Lvl 0.7 0.6 - 1.2 mmol/L    Lithium Dose Amount UNKNOWN     Lithium Date Last Dose UNKNOWN     Lithium Dose Time UNKNOWN    URINE DRUG SCREEN   Result Value Ref Range    Amphetamine Screen, Ur NEGATIVE NEG    Barbiturate Screen, Ur NEGATIVE NEG    Benzodiazepine Screen, Urine NEGATIVE NEG    Cocaine Metabolite, Urine NEGATIVE NEG    Methadone Screen, Urine NEGATIVE NEG    Opiates, Urine NEGATIVE NEG    Phencyclidine, Urine NEGATIVE NEG    Propoxyphene, Urine NOT REPORTED NEG    Cannabinoid Scrn, Ur NEGATIVE NEG    Oxycodone Screen, Ur NEGATIVE NEG    Methamphetamine, Urine NOT REPORTED NEG    Tricyclic Antidepressants, Ur NOT REPORTED NEG    MDMA URINE NOT REPORTED NEG    Buprenorphine Urine NOT REPORTED NEG    Test Information       Assay provides medical screening only. The absence of expected drug(s) and/or   Urinalysis, Routine   Result Value Ref Range    Color, UA YELLOW YEL    Turbidity UA CLEAR CLEAR    Glucose, Ur 3+ (A) NEG    Bilirubin Urine NEGATIVE NEG    Ketones, Urine NEGATIVE NEG    Specific [place], UA 1.027 1.000 - 1.030    Urine Hgb NEGATIVE NEG    pH, UA 6.0 5.0 - 8.0    Protein, UA NEGATIVE NEG    Urobilinogen, Urine Normal NORM    Nitrite, Urine NEGATIVE NEG    Leukocyte Esterase, Urine NEGATIVE NEG    Urinalysis Comments       Microscopic exam not performed based on chemical results unless requested in   TSH without Reflex   Result Value Ref Range    TSH 0.83 0.30 - 5.00 mIU/L   T4, free   Result Value Ref Range    Thyroxine, Free 1.26 0.93 - 1.70 ng/dL   LITHIUM LEVEL   Result Value Ref Range    Lithium Lvl 0.5 (L) 0.6 - 1.2 mmol/L    Lithium Dose Amount UNKNOWN     Lithium Date Last Dose UNKNOWN     Lithium Dose Time UNKNOWN    CREATININE, SERUM   Result Value Ref Range    CREATININE 0.74 0.70 - 1.20 mg/dL    GFR Non-African American >60 >60 mL/min    GFR African American >60 >60 mL/min    GFR Comment          GFR Staging NOT REPORTED    LITHIUM LEVEL   Result Value Ref Range    Lithium Lvl 0.8 0.6 - 1.2 mmol/L    Lithium Dose Amount 600     Lithium Date Last Dose 112,817     Lithium Dose Time 2,110    POCT Glucose   Result Value Ref Range    Glucose 354 mg/dL    QC OK?  yes    POC Glucose Fingerstick   Result Value Ref Range    POC Glucose 354 (H) 75 - 110 mg/dL   EKG 12 lead   Result Value Ref Range    Ventricular Rate 84 BPM    Atrial Rate 84 BPM    P-R Interval 178 ms    QRS Duration 132 ms    Q-T Interval 380 ms    QTc Calculation (Bazett) 449 ms    P Axis 40 degrees    R Axis -42 degrees    T Axis 27 degrees       Treatments: Supportive and insight oriented therapy  Group therapy  Medications listed

## 2018-01-05 PROCEDURE — 1240000000 HC EMOTIONAL WELLNESS R&B

## 2018-01-05 PROCEDURE — 6370000000 HC RX 637 (ALT 250 FOR IP): Performed by: PSYCHIATRY & NEUROLOGY

## 2018-01-05 RX ADMIN — GEMFIBROZIL 600 MG: 600 TABLET ORAL at 08:17

## 2018-01-05 RX ADMIN — LOSARTAN POTASSIUM 50 MG: 50 TABLET, FILM COATED ORAL at 08:19

## 2018-01-05 RX ADMIN — SIMVASTATIN 10 MG: 20 TABLET, FILM COATED ORAL at 21:07

## 2018-01-05 RX ADMIN — CLONAZEPAM 0.5 MG: 0.5 TABLET ORAL at 21:07

## 2018-01-05 RX ADMIN — ALUMINUM HYDROXIDE, MAGNESIUM HYDROXIDE, AND SIMETHICONE 30 ML: 200; 200; 20 SUSPENSION ORAL at 09:03

## 2018-01-05 RX ADMIN — METFORMIN HYDROCHLORIDE 1000 MG: 500 TABLET, FILM COATED ORAL at 17:00

## 2018-01-05 RX ADMIN — OLANZAPINE 7.5 MG: 7.5 TABLET, FILM COATED ORAL at 21:07

## 2018-01-05 RX ADMIN — GEMFIBROZIL 600 MG: 600 TABLET ORAL at 21:07

## 2018-01-05 RX ADMIN — LITHIUM CARBONATE 600 MG: 300 TABLET, FILM COATED, EXTENDED RELEASE ORAL at 21:07

## 2018-01-05 RX ADMIN — GLIPIZIDE 5 MG: 5 TABLET ORAL at 08:18

## 2018-01-05 RX ADMIN — CLONAZEPAM 0.5 MG: 0.5 TABLET ORAL at 08:16

## 2018-01-05 RX ADMIN — LITHIUM CARBONATE 600 MG: 300 TABLET, FILM COATED, EXTENDED RELEASE ORAL at 08:18

## 2018-01-05 RX ADMIN — METFORMIN HYDROCHLORIDE 1000 MG: 500 TABLET, FILM COATED ORAL at 08:19

## 2018-01-05 ASSESSMENT — PAIN DESCRIPTION - DESCRIPTORS: DESCRIPTORS: ACHING;CONSTANT

## 2018-01-05 ASSESSMENT — PAIN SCALES - GENERAL
PAINLEVEL_OUTOF10: 6
PAINLEVEL_OUTOF10: 7

## 2018-01-05 ASSESSMENT — PAIN DESCRIPTION - LOCATION
LOCATION: BACK;FOOT
LOCATION: FOOT

## 2018-01-05 ASSESSMENT — PAIN DESCRIPTION - ORIENTATION
ORIENTATION: RIGHT;LEFT
ORIENTATION: RIGHT;LEFT
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
## 2018-01-06 PROCEDURE — 1240000000 HC EMOTIONAL WELLNESS R&B

## 2018-01-06 PROCEDURE — 6370000000 HC RX 637 (ALT 250 FOR IP): Performed by: PSYCHIATRY & NEUROLOGY

## 2018-01-06 RX ADMIN — METFORMIN HYDROCHLORIDE 1000 MG: 500 TABLET, FILM COATED ORAL at 08:30

## 2018-01-06 RX ADMIN — METFORMIN HYDROCHLORIDE 1000 MG: 500 TABLET, FILM COATED ORAL at 17:52

## 2018-01-06 RX ADMIN — GLIPIZIDE 5 MG: 5 TABLET ORAL at 08:30

## 2018-01-06 RX ADMIN — GEMFIBROZIL 600 MG: 600 TABLET ORAL at 08:30

## 2018-01-06 RX ADMIN — OLANZAPINE 7.5 MG: 7.5 TABLET, FILM COATED ORAL at 22:04

## 2018-01-06 RX ADMIN — GEMFIBROZIL 600 MG: 600 TABLET ORAL at 22:04

## 2018-01-06 RX ADMIN — CLONAZEPAM 0.5 MG: 0.5 TABLET ORAL at 22:04

## 2018-01-06 RX ADMIN — ACETAMINOPHEN 650 MG: 325 TABLET, FILM COATED ORAL at 22:04

## 2018-01-06 RX ADMIN — LITHIUM CARBONATE 600 MG: 300 TABLET, FILM COATED, EXTENDED RELEASE ORAL at 22:05

## 2018-01-06 RX ADMIN — LITHIUM CARBONATE 600 MG: 300 TABLET, FILM COATED, EXTENDED RELEASE ORAL at 08:30

## 2018-01-06 RX ADMIN — LOSARTAN POTASSIUM 50 MG: 50 TABLET, FILM COATED ORAL at 08:30

## 2018-01-06 RX ADMIN — POLYVINYL ALCOHOL 1 DROP: 14 SOLUTION/ DROPS OPHTHALMIC at 22:07

## 2018-01-06 RX ADMIN — SIMVASTATIN 10 MG: 20 TABLET, FILM COATED ORAL at 22:05

## 2018-01-06 RX ADMIN — CLONAZEPAM 0.5 MG: 0.5 TABLET ORAL at 08:30

## 2018-01-06 ASSESSMENT — PAIN SCALES - GENERAL
PAINLEVEL_OUTOF10: 0
PAINLEVEL_OUTOF10: 3

## 2018-01-07 PROCEDURE — 6370000000 HC RX 637 (ALT 250 FOR IP): Performed by: PSYCHIATRY & NEUROLOGY

## 2018-01-07 PROCEDURE — 1240000000 HC EMOTIONAL WELLNESS R&B

## 2018-01-07 RX ADMIN — METFORMIN HYDROCHLORIDE 1000 MG: 500 TABLET, FILM COATED ORAL at 18:06

## 2018-01-07 RX ADMIN — GEMFIBROZIL 600 MG: 600 TABLET ORAL at 22:07

## 2018-01-07 RX ADMIN — LITHIUM CARBONATE 600 MG: 300 TABLET, FILM COATED, EXTENDED RELEASE ORAL at 22:07

## 2018-01-07 RX ADMIN — OLANZAPINE 7.5 MG: 7.5 TABLET, FILM COATED ORAL at 22:08

## 2018-01-07 RX ADMIN — POLYVINYL ALCOHOL 1 DROP: 14 SOLUTION/ DROPS OPHTHALMIC at 07:37

## 2018-01-07 RX ADMIN — CLONAZEPAM 0.5 MG: 0.5 TABLET ORAL at 22:07

## 2018-01-07 RX ADMIN — POLYVINYL ALCOHOL 1 DROP: 14 SOLUTION/ DROPS OPHTHALMIC at 22:08

## 2018-01-07 RX ADMIN — SIMVASTATIN 10 MG: 20 TABLET, FILM COATED ORAL at 22:07

## 2018-01-07 RX ADMIN — GEMFIBROZIL 600 MG: 600 TABLET ORAL at 08:36

## 2018-01-07 RX ADMIN — LITHIUM CARBONATE 600 MG: 300 TABLET, FILM COATED, EXTENDED RELEASE ORAL at 08:36

## 2018-01-07 RX ADMIN — LOSARTAN POTASSIUM 50 MG: 50 TABLET, FILM COATED ORAL at 08:36

## 2018-01-07 RX ADMIN — METFORMIN HYDROCHLORIDE 1000 MG: 500 TABLET, FILM COATED ORAL at 08:36

## 2018-01-07 RX ADMIN — ACETAMINOPHEN 650 MG: 325 TABLET, FILM COATED ORAL at 22:08

## 2018-01-07 RX ADMIN — CLONAZEPAM 0.5 MG: 0.5 TABLET ORAL at 08:36

## 2018-01-07 RX ADMIN — GLIPIZIDE 5 MG: 5 TABLET ORAL at 08:36

## 2018-01-07 ASSESSMENT — PAIN SCALES - GENERAL
PAINLEVEL_OUTOF10: 0
PAINLEVEL_OUTOF10: 8
PAINLEVEL_OUTOF10: 3

## 2018-01-07 ASSESSMENT — PAIN DESCRIPTION - ORIENTATION: ORIENTATION: RIGHT;LEFT

## 2018-01-07 ASSESSMENT — PAIN DESCRIPTION - LOCATION: LOCATION: FOOT

## 2018-01-08 PROCEDURE — 1240000000 HC EMOTIONAL WELLNESS R&B

## 2018-01-08 PROCEDURE — 6370000000 HC RX 637 (ALT 250 FOR IP): Performed by: PSYCHIATRY & NEUROLOGY

## 2018-01-08 RX ADMIN — GEMFIBROZIL 600 MG: 600 TABLET ORAL at 08:14

## 2018-01-08 RX ADMIN — LOSARTAN POTASSIUM 50 MG: 50 TABLET, FILM COATED ORAL at 08:14

## 2018-01-08 RX ADMIN — CLONAZEPAM 0.5 MG: 0.5 TABLET ORAL at 08:14

## 2018-01-08 RX ADMIN — LITHIUM CARBONATE 600 MG: 300 TABLET, FILM COATED, EXTENDED RELEASE ORAL at 21:55

## 2018-01-08 RX ADMIN — POLYVINYL ALCOHOL 1 DROP: 14 SOLUTION/ DROPS OPHTHALMIC at 06:05

## 2018-01-08 RX ADMIN — METFORMIN HYDROCHLORIDE 1000 MG: 500 TABLET, FILM COATED ORAL at 08:14

## 2018-01-08 RX ADMIN — LITHIUM CARBONATE 600 MG: 300 TABLET, FILM COATED, EXTENDED RELEASE ORAL at 08:17

## 2018-01-08 RX ADMIN — CLONAZEPAM 0.5 MG: 0.5 TABLET ORAL at 21:54

## 2018-01-08 RX ADMIN — SIMVASTATIN 10 MG: 20 TABLET, FILM COATED ORAL at 21:55

## 2018-01-08 RX ADMIN — GLIPIZIDE 5 MG: 5 TABLET ORAL at 08:14

## 2018-01-08 RX ADMIN — METFORMIN HYDROCHLORIDE 1000 MG: 500 TABLET, FILM COATED ORAL at 18:30

## 2018-01-08 RX ADMIN — OLANZAPINE 7.5 MG: 7.5 TABLET, FILM COATED ORAL at 21:54

## 2018-01-08 RX ADMIN — GEMFIBROZIL 600 MG: 600 TABLET ORAL at 21:55

## 2018-01-08 ASSESSMENT — PAIN DESCRIPTION - LOCATION
LOCATION: HAND;BACK;FOOT
LOCATION: FOOT

## 2018-01-08 ASSESSMENT — PAIN DESCRIPTION - ORIENTATION: ORIENTATION: RIGHT;LEFT

## 2018-01-08 ASSESSMENT — PAIN SCALES - GENERAL
PAINLEVEL_OUTOF10: 9
PAINLEVEL_OUTOF10: 9

## 2018-01-08 ASSESSMENT — PAIN DESCRIPTION - PAIN TYPE: TYPE: ACUTE PAIN

## 2018-01-09 PROCEDURE — 1240000000 HC EMOTIONAL WELLNESS R&B

## 2018-01-09 PROCEDURE — 6370000000 HC RX 637 (ALT 250 FOR IP): Performed by: PSYCHIATRY & NEUROLOGY

## 2018-01-09 RX ADMIN — LOSARTAN POTASSIUM 50 MG: 50 TABLET, FILM COATED ORAL at 08:28

## 2018-01-09 RX ADMIN — METFORMIN HYDROCHLORIDE 1000 MG: 500 TABLET, FILM COATED ORAL at 08:27

## 2018-01-09 RX ADMIN — CLONAZEPAM 0.5 MG: 0.5 TABLET ORAL at 08:26

## 2018-01-09 RX ADMIN — POLYVINYL ALCOHOL 1 DROP: 14 SOLUTION/ DROPS OPHTHALMIC at 15:51

## 2018-01-09 RX ADMIN — METFORMIN HYDROCHLORIDE 1000 MG: 500 TABLET, FILM COATED ORAL at 17:17

## 2018-01-09 RX ADMIN — LITHIUM CARBONATE 600 MG: 300 TABLET, FILM COATED, EXTENDED RELEASE ORAL at 22:13

## 2018-01-09 RX ADMIN — LITHIUM CARBONATE 600 MG: 300 TABLET, FILM COATED, EXTENDED RELEASE ORAL at 08:27

## 2018-01-09 RX ADMIN — OLANZAPINE 7.5 MG: 7.5 TABLET, FILM COATED ORAL at 22:13

## 2018-01-09 RX ADMIN — CLONAZEPAM 0.5 MG: 0.5 TABLET ORAL at 22:13

## 2018-01-09 RX ADMIN — GLIPIZIDE 5 MG: 5 TABLET ORAL at 08:27

## 2018-01-09 RX ADMIN — GEMFIBROZIL 600 MG: 600 TABLET ORAL at 22:13

## 2018-01-09 RX ADMIN — SIMVASTATIN 10 MG: 20 TABLET, FILM COATED ORAL at 22:13

## 2018-01-09 RX ADMIN — GEMFIBROZIL 600 MG: 600 TABLET ORAL at 08:27

## 2018-01-09 ASSESSMENT — PAIN DESCRIPTION - LOCATION
LOCATION: BACK;FOOT
LOCATION: BACK

## 2018-01-09 ASSESSMENT — PAIN DESCRIPTION - ORIENTATION: ORIENTATION: RIGHT;LEFT

## 2018-01-09 ASSESSMENT — PAIN SCALES - GENERAL
PAINLEVEL_OUTOF10: 8
PAINLEVEL_OUTOF10: 7

## 2018-01-09 ASSESSMENT — PAIN DESCRIPTION - PAIN TYPE
TYPE: ACUTE PAIN
TYPE: ACUTE PAIN

## 2018-01-09 NOTE — PROGRESS NOTES
Psychiatry Progress  Note     CHIEF  COMPLAINT     Severe bipolar I disorder, most recent episode mixed, with psychotic features ([facility]        SUBJECT BHARTI AND OBJECT BHARTI:    Nuria Sierra is presenting with persistant depressed moods, less agitation than yesterday and thought blocking, moods fluctuating. with no hallucinations and thoughts are tangential. The symptoms still   present are marked in severity    . ASSESSMENT AND PLAN    Symptoms of illness and medications  discussed with pt and support provided. Progress of treatment discussed with staff     Encouraged to participate in activies and groups. His   Severe bipolar I disorder, most recent episode mixed, with psychotic features ([facility]  is gradually improving.        clonazePAM  0.5 mg Oral BID    OLANZapine  7.5 mg Oral Nightly    gemfibrozil  600 mg Oral BID    glipiZIDE  5 mg Oral Daily    lithium  600 mg Oral 2 times per day    losartan  50 mg Oral Daily    metFORMIN  1,000 mg Oral BID WC    simvastatin  10 mg Oral Nightly       polyvinyl alcohol, acetaminophen, LORazepam, traZODone, benztropine mesylate, magnesium hydroxide, aluminum & magnesium hydroxide-simethicone, loperamide, haloperidol **OR** haloperidol lactate       Meagan Jauregui MD  Psychiatry

## 2018-01-10 PROCEDURE — 6370000000 HC RX 637 (ALT 250 FOR IP): Performed by: PSYCHIATRY & NEUROLOGY

## 2018-01-10 PROCEDURE — 1240000000 HC EMOTIONAL WELLNESS R&B

## 2018-01-10 RX ADMIN — METFORMIN HYDROCHLORIDE 1000 MG: 500 TABLET, FILM COATED ORAL at 18:44

## 2018-01-10 RX ADMIN — POLYVINYL ALCOHOL 1 DROP: 14 SOLUTION/ DROPS OPHTHALMIC at 06:17

## 2018-01-10 RX ADMIN — LITHIUM CARBONATE 600 MG: 300 TABLET, FILM COATED, EXTENDED RELEASE ORAL at 09:40

## 2018-01-10 RX ADMIN — LOSARTAN POTASSIUM 50 MG: 50 TABLET, FILM COATED ORAL at 09:40

## 2018-01-10 RX ADMIN — LITHIUM CARBONATE 600 MG: 300 TABLET, FILM COATED, EXTENDED RELEASE ORAL at 21:46

## 2018-01-10 RX ADMIN — TRAZODONE HYDROCHLORIDE 50 MG: 50 TABLET ORAL at 21:46

## 2018-01-10 RX ADMIN — CLONAZEPAM 0.5 MG: 0.5 TABLET ORAL at 09:40

## 2018-01-10 RX ADMIN — GEMFIBROZIL 600 MG: 600 TABLET ORAL at 21:47

## 2018-01-10 RX ADMIN — METFORMIN HYDROCHLORIDE 1000 MG: 500 TABLET, FILM COATED ORAL at 09:40

## 2018-01-10 RX ADMIN — ACETAMINOPHEN 650 MG: 325 TABLET, FILM COATED ORAL at 11:46

## 2018-01-10 RX ADMIN — OLANZAPINE 7.5 MG: 7.5 TABLET, FILM COATED ORAL at 21:46

## 2018-01-10 RX ADMIN — GEMFIBROZIL 600 MG: 600 TABLET ORAL at 09:40

## 2018-01-10 RX ADMIN — SIMVASTATIN 10 MG: 20 TABLET, FILM COATED ORAL at 21:46

## 2018-01-10 RX ADMIN — GLIPIZIDE 5 MG: 5 TABLET ORAL at 09:40

## 2018-01-10 RX ADMIN — CLONAZEPAM 0.5 MG: 0.5 TABLET ORAL at 21:46

## 2018-01-10 RX ADMIN — POLYVINYL ALCOHOL 1 DROP: 14 SOLUTION/ DROPS OPHTHALMIC at 21:46

## 2018-01-10 ASSESSMENT — PAIN SCALES - GENERAL
PAINLEVEL_OUTOF10: 8
PAINLEVEL_OUTOF10: 1
PAINLEVEL_OUTOF10: 8
PAINLEVEL_OUTOF10: 3

## 2018-01-10 ASSESSMENT — PAIN DESCRIPTION - PAIN TYPE: TYPE: ACUTE PAIN

## 2018-01-10 ASSESSMENT — PAIN DESCRIPTION - LOCATION
LOCATION: BACK;FOOT
LOCATION: BACK;FOOT

## 2018-01-10 ASSESSMENT — PAIN DESCRIPTION - ORIENTATION
ORIENTATION: LEFT;RIGHT
ORIENTATION: RIGHT;LEFT

## 2018-01-10 NOTE — PROGRESS NOTES
Psychiatry Progress  Note     CHIEF  COMPLAINT     Severe bipolar I disorder, most recent episode mixed, with psychotic features ([facility]        SUBJECT BHARTI AND OBJECT BHARTI:    Jhonny Jansen is presenting with persistant depressed moods,  delusional thinking and less depressed mood than yesterday moods fluctuating. with no hallucinations and thoughts are circumstantial. The symptoms still   present are marked in severity    . ASSESSMENT AND PLAN    Symptoms of illness and medications  discussed with pt and support provided. Progress of treatment discussed with staff     Encouraged to participate in activies and groups. His   Severe bipolar I disorder, most recent episode mixed, with psychotic features ([facility]  is gradually improving. Pt is developing insight into illness and coping skills are improving       clonazePAM  0.5 mg Oral BID    OLANZapine  7.5 mg Oral Nightly    gemfibrozil  600 mg Oral BID    glipiZIDE  5 mg Oral Daily    lithium  600 mg Oral 2 times per day    losartan  50 mg Oral Daily    metFORMIN  1,000 mg Oral BID WC    simvastatin  10 mg Oral Nightly       polyvinyl alcohol, acetaminophen, LORazepam, traZODone, benztropine mesylate, magnesium hydroxide, aluminum & magnesium hydroxide-simethicone, loperamide, haloperidol **OR** haloperidol lactate       Dhara Flores MD  Psychiatry

## 2018-01-10 NOTE — PROGRESS NOTES
Psychiatry Progress  Note     CHIEF  COMPLAINT     Severe bipolar I disorder, most recent episode mixed, with psychotic features ([facility]        SUBJECT BHARTI AND OBJECT BHARTI:    Jhonny Jansen is presenting with limited participation in groups, less depressed mood than yesterday and less agitation than yesterday moods fluctuating. with no hallucinations and thoughts are circumstantial. The symptoms still   present are marked in severity    . ASSESSMENT AND PLAN    Symptoms of illness and medications  discussed with pt and support provided. Progress of treatment discussed with staff     Encouraged to participate in activies and groups. His   Severe bipolar I disorder, most recent episode mixed, with psychotic features ([facility]  is gradually improving.        clonazePAM  0.5 mg Oral BID    OLANZapine  7.5 mg Oral Nightly    gemfibrozil  600 mg Oral BID    glipiZIDE  5 mg Oral Daily    lithium  600 mg Oral 2 times per day    losartan  50 mg Oral Daily    metFORMIN  1,000 mg Oral BID WC    simvastatin  10 mg Oral Nightly       polyvinyl alcohol, acetaminophen, LORazepam, traZODone, benztropine mesylate, magnesium hydroxide, aluminum & magnesium hydroxide-simethicone, loperamide, haloperidol **OR** haloperidol lactate       Dhara Flores MD  Psychiatry
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
## 2018-01-11 PROCEDURE — 6370000000 HC RX 637 (ALT 250 FOR IP): Performed by: PSYCHIATRY & NEUROLOGY

## 2018-01-11 PROCEDURE — 1240000000 HC EMOTIONAL WELLNESS R&B

## 2018-01-11 RX ADMIN — LOSARTAN POTASSIUM 50 MG: 50 TABLET, FILM COATED ORAL at 08:09

## 2018-01-11 RX ADMIN — POLYVINYL ALCOHOL 1 DROP: 14 SOLUTION/ DROPS OPHTHALMIC at 07:20

## 2018-01-11 RX ADMIN — METFORMIN HYDROCHLORIDE 1000 MG: 500 TABLET, FILM COATED ORAL at 08:09

## 2018-01-11 RX ADMIN — LITHIUM CARBONATE 600 MG: 300 TABLET, FILM COATED, EXTENDED RELEASE ORAL at 22:00

## 2018-01-11 RX ADMIN — LITHIUM CARBONATE 600 MG: 300 TABLET, FILM COATED, EXTENDED RELEASE ORAL at 08:12

## 2018-01-11 RX ADMIN — CLONAZEPAM 0.5 MG: 0.5 TABLET ORAL at 21:59

## 2018-01-11 RX ADMIN — CLONAZEPAM 0.5 MG: 0.5 TABLET ORAL at 08:09

## 2018-01-11 RX ADMIN — OLANZAPINE 7.5 MG: 7.5 TABLET, FILM COATED ORAL at 21:59

## 2018-01-11 RX ADMIN — GEMFIBROZIL 600 MG: 600 TABLET ORAL at 08:09

## 2018-01-11 RX ADMIN — GEMFIBROZIL 600 MG: 600 TABLET ORAL at 21:59

## 2018-01-11 RX ADMIN — METFORMIN HYDROCHLORIDE 1000 MG: 500 TABLET, FILM COATED ORAL at 17:49

## 2018-01-11 RX ADMIN — GLIPIZIDE 5 MG: 5 TABLET ORAL at 08:09

## 2018-01-11 ASSESSMENT — PAIN DESCRIPTION - LOCATION: LOCATION: BACK;FOOT

## 2018-01-11 ASSESSMENT — PAIN SCALES - GENERAL: PAINLEVEL_OUTOF10: 8

## 2018-01-11 ASSESSMENT — PAIN DESCRIPTION - PAIN TYPE: TYPE: ACUTE PAIN

## 2018-01-11 NOTE — BH NOTE
Psychoeducation Group Note    Date: 1/11/18  Start Time: 1330  End Time: 1410    Number Participants in Group:  13 / 23    Goal:  Patient will demonstrate increased interpersonal interaction   Topic:  Frustration tolerance / Reflection     Discipline Responsible:   OT  AT   x Nsg.  RT  Other       Participation Level:     None x Minimal    Active Listener x Interactive    Monopolizing         Participation Quality:  x Appropriate  Inappropriate          Attentive        Intrusive          Sharing        Resistant          Supportive        Lethargic       Affective:   x Congruent  Incongruent  Blunted  Flat    Constricted  Anxious  Elated  Angry    Labile  Depressed  Other         Cognitive:  x Alert x Oriented PPTP     Concentration  G x F  P   Attention Span  G x F  P   Short-Term Memory  G x F  P   Long-Term Memory  G x F  P   ProblemSolving/  Decision Making  G x F  P   Ability to Process  Information  G x F  P      Contributing Factors             Delusional             Hallucinating             Flight of Ideas             Other:       Modes of Intervention:   Education  Support x Exploration    Clarifying  Problem Solving  Confrontation   x Socialization  Limit Setting  Reality Testing    Activity  Movement  Media    Other:            Response to Learning:  x Able to verbalize current knowledge/experience    Able to verbalize/acknowledge new learning    Able to retain information    Capable of insight    Able to change behavior    Progressing to goal    Other:        Comments:

## 2018-01-12 PROCEDURE — 6370000000 HC RX 637 (ALT 250 FOR IP): Performed by: PSYCHIATRY & NEUROLOGY

## 2018-01-12 PROCEDURE — 1240000000 HC EMOTIONAL WELLNESS R&B

## 2018-01-12 RX ADMIN — GEMFIBROZIL 600 MG: 600 TABLET ORAL at 08:04

## 2018-01-12 RX ADMIN — GLIPIZIDE 5 MG: 5 TABLET ORAL at 08:04

## 2018-01-12 RX ADMIN — METFORMIN HYDROCHLORIDE 1000 MG: 500 TABLET, FILM COATED ORAL at 08:04

## 2018-01-12 RX ADMIN — LITHIUM CARBONATE 600 MG: 300 TABLET, FILM COATED, EXTENDED RELEASE ORAL at 22:09

## 2018-01-12 RX ADMIN — LOSARTAN POTASSIUM 50 MG: 50 TABLET, FILM COATED ORAL at 08:04

## 2018-01-12 RX ADMIN — CLONAZEPAM 0.5 MG: 0.5 TABLET ORAL at 08:04

## 2018-01-12 RX ADMIN — GEMFIBROZIL 600 MG: 600 TABLET ORAL at 22:11

## 2018-01-12 RX ADMIN — OLANZAPINE 7.5 MG: 7.5 TABLET, FILM COATED ORAL at 22:09

## 2018-01-12 RX ADMIN — SIMVASTATIN 10 MG: 20 TABLET, FILM COATED ORAL at 22:09

## 2018-01-12 RX ADMIN — POLYVINYL ALCOHOL 1 DROP: 14 SOLUTION/ DROPS OPHTHALMIC at 07:25

## 2018-01-12 RX ADMIN — LITHIUM CARBONATE 600 MG: 300 TABLET, FILM COATED, EXTENDED RELEASE ORAL at 08:04

## 2018-01-12 RX ADMIN — METFORMIN HYDROCHLORIDE 1000 MG: 500 TABLET, FILM COATED ORAL at 17:26

## 2018-01-12 RX ADMIN — CLONAZEPAM 0.5 MG: 0.5 TABLET ORAL at 22:09

## 2018-01-12 ASSESSMENT — PAIN DESCRIPTION - PAIN TYPE: TYPE: ACUTE PAIN

## 2018-01-12 ASSESSMENT — PAIN DESCRIPTION - LOCATION: LOCATION: BACK;HEAD;NECK

## 2018-01-12 ASSESSMENT — PAIN SCALES - GENERAL: PAINLEVEL_OUTOF10: 8

## 2018-01-12 NOTE — PROGRESS NOTES
Psychiatry Progress  Note     CHIEF  COMPLAINT     Severe bipolar I disorder, most recent episode mixed, with psychotic features ([facility]        SUBJECT BHARTI AND OBJECT BHARTI:    Lenard Stevenson is presenting with persistant depressed moods, limited participation in groups and less agitation than yesterday moods fluctuating. with no hallucinations and thoughts are tangential. The symptoms still   present are marked in severity    . ASSESSMENT AND PLAN    Symptoms of illness and medications  discussed with pt and support provided. Progress of treatment discussed with staff     Encouraged to participate in activies and groups. His   Severe bipolar I disorder, most recent episode mixed, with psychotic features ([facility]  is gradually improving.        clonazePAM  0.5 mg Oral BID    OLANZapine  7.5 mg Oral Nightly    gemfibrozil  600 mg Oral BID    glipiZIDE  5 mg Oral Daily    lithium  600 mg Oral 2 times per day    losartan  50 mg Oral Daily    metFORMIN  1,000 mg Oral BID WC    simvastatin  10 mg Oral Nightly       polyvinyl alcohol, acetaminophen, LORazepam, traZODone, benztropine mesylate, magnesium hydroxide, aluminum & magnesium hydroxide-simethicone, loperamide, haloperidol **OR** haloperidol lactate       Allison Dee MD  Psychiatry

## 2018-01-13 PROCEDURE — 1240000000 HC EMOTIONAL WELLNESS R&B

## 2018-01-13 PROCEDURE — 6370000000 HC RX 637 (ALT 250 FOR IP): Performed by: PSYCHIATRY & NEUROLOGY

## 2018-01-13 RX ADMIN — METFORMIN HYDROCHLORIDE 1000 MG: 500 TABLET, FILM COATED ORAL at 17:48

## 2018-01-13 RX ADMIN — GLIPIZIDE 5 MG: 5 TABLET ORAL at 08:42

## 2018-01-13 RX ADMIN — OLANZAPINE 7.5 MG: 7.5 TABLET, FILM COATED ORAL at 22:02

## 2018-01-13 RX ADMIN — GEMFIBROZIL 600 MG: 600 TABLET ORAL at 22:02

## 2018-01-13 RX ADMIN — SIMVASTATIN 10 MG: 20 TABLET, FILM COATED ORAL at 22:01

## 2018-01-13 RX ADMIN — CLONAZEPAM 0.5 MG: 0.5 TABLET ORAL at 08:42

## 2018-01-13 RX ADMIN — CLONAZEPAM 0.5 MG: 0.5 TABLET ORAL at 22:01

## 2018-01-13 RX ADMIN — POLYVINYL ALCOHOL 1 DROP: 14 SOLUTION/ DROPS OPHTHALMIC at 07:01

## 2018-01-13 RX ADMIN — METFORMIN HYDROCHLORIDE 1000 MG: 500 TABLET, FILM COATED ORAL at 08:42

## 2018-01-13 RX ADMIN — LITHIUM CARBONATE 600 MG: 300 TABLET, FILM COATED, EXTENDED RELEASE ORAL at 22:02

## 2018-01-13 RX ADMIN — GEMFIBROZIL 600 MG: 600 TABLET ORAL at 08:42

## 2018-01-13 RX ADMIN — LOSARTAN POTASSIUM 50 MG: 50 TABLET, FILM COATED ORAL at 08:42

## 2018-01-13 RX ADMIN — LITHIUM CARBONATE 600 MG: 300 TABLET, FILM COATED, EXTENDED RELEASE ORAL at 08:42

## 2018-01-13 ASSESSMENT — PAIN DESCRIPTION - LOCATION
LOCATION: BACK;NECK
LOCATION: BACK;NECK

## 2018-01-13 ASSESSMENT — PAIN SCALES - GENERAL
PAINLEVEL_OUTOF10: 8
PAINLEVEL_OUTOF10: 8

## 2018-01-14 PROCEDURE — 6370000000 HC RX 637 (ALT 250 FOR IP): Performed by: PSYCHIATRY & NEUROLOGY

## 2018-01-14 PROCEDURE — 1240000000 HC EMOTIONAL WELLNESS R&B

## 2018-01-14 RX ADMIN — CLONAZEPAM 0.5 MG: 0.5 TABLET ORAL at 08:34

## 2018-01-14 RX ADMIN — LITHIUM CARBONATE 600 MG: 300 TABLET, FILM COATED, EXTENDED RELEASE ORAL at 08:34

## 2018-01-14 RX ADMIN — GEMFIBROZIL 600 MG: 600 TABLET ORAL at 22:16

## 2018-01-14 RX ADMIN — GEMFIBROZIL 600 MG: 600 TABLET ORAL at 08:34

## 2018-01-14 RX ADMIN — METFORMIN HYDROCHLORIDE 1000 MG: 500 TABLET, FILM COATED ORAL at 08:34

## 2018-01-14 RX ADMIN — CLONAZEPAM 0.5 MG: 0.5 TABLET ORAL at 22:16

## 2018-01-14 RX ADMIN — POLYVINYL ALCOHOL 1 DROP: 14 SOLUTION/ DROPS OPHTHALMIC at 08:36

## 2018-01-14 RX ADMIN — OLANZAPINE 7.5 MG: 7.5 TABLET, FILM COATED ORAL at 22:16

## 2018-01-14 RX ADMIN — SIMVASTATIN 10 MG: 20 TABLET, FILM COATED ORAL at 22:16

## 2018-01-14 RX ADMIN — METFORMIN HYDROCHLORIDE 1000 MG: 500 TABLET, FILM COATED ORAL at 17:54

## 2018-01-14 RX ADMIN — LOSARTAN POTASSIUM 50 MG: 50 TABLET, FILM COATED ORAL at 08:34

## 2018-01-14 RX ADMIN — GLIPIZIDE 5 MG: 5 TABLET ORAL at 08:34

## 2018-01-14 RX ADMIN — LITHIUM CARBONATE 600 MG: 300 TABLET, FILM COATED, EXTENDED RELEASE ORAL at 22:16

## 2018-01-14 ASSESSMENT — PAIN DESCRIPTION - LOCATION
LOCATION: FOOT;BACK;NECK
LOCATION: FOOT;BACK;NECK

## 2018-01-14 ASSESSMENT — PAIN DESCRIPTION - PAIN TYPE: TYPE: ACUTE PAIN

## 2018-01-14 ASSESSMENT — PAIN DESCRIPTION - ORIENTATION
ORIENTATION: RIGHT;LEFT
ORIENTATION: RIGHT;LEFT

## 2018-01-14 ASSESSMENT — PAIN SCALES - GENERAL
PAINLEVEL_OUTOF10: 8
PAINLEVEL_OUTOF10: 8

## 2018-01-14 NOTE — BH NOTE
Pt declined to participate in [address] despite encouragement and prompts from staff. Pt is resting in room.

## 2018-01-15 PROCEDURE — 1240000000 HC EMOTIONAL WELLNESS R&B

## 2018-01-15 PROCEDURE — 6370000000 HC RX 637 (ALT 250 FOR IP): Performed by: PSYCHIATRY & NEUROLOGY

## 2018-01-15 RX ADMIN — METFORMIN HYDROCHLORIDE 1000 MG: 500 TABLET, FILM COATED ORAL at 08:39

## 2018-01-15 RX ADMIN — TRAZODONE HYDROCHLORIDE 50 MG: 50 TABLET ORAL at 21:32

## 2018-01-15 RX ADMIN — SIMVASTATIN 10 MG: 20 TABLET, FILM COATED ORAL at 21:32

## 2018-01-15 RX ADMIN — LOSARTAN POTASSIUM 50 MG: 50 TABLET, FILM COATED ORAL at 08:39

## 2018-01-15 RX ADMIN — METFORMIN HYDROCHLORIDE 1000 MG: 500 TABLET, FILM COATED ORAL at 17:59

## 2018-01-15 RX ADMIN — GEMFIBROZIL 600 MG: 600 TABLET ORAL at 08:41

## 2018-01-15 RX ADMIN — LITHIUM CARBONATE 600 MG: 300 TABLET, FILM COATED, EXTENDED RELEASE ORAL at 08:39

## 2018-01-15 RX ADMIN — CLONAZEPAM 0.5 MG: 0.5 TABLET ORAL at 21:32

## 2018-01-15 RX ADMIN — OLANZAPINE 7.5 MG: 7.5 TABLET, FILM COATED ORAL at 21:32

## 2018-01-15 RX ADMIN — CLONAZEPAM 0.5 MG: 0.5 TABLET ORAL at 08:39

## 2018-01-15 RX ADMIN — GEMFIBROZIL 600 MG: 600 TABLET ORAL at 21:32

## 2018-01-15 RX ADMIN — LITHIUM CARBONATE 600 MG: 300 TABLET, FILM COATED, EXTENDED RELEASE ORAL at 21:32

## 2018-01-15 RX ADMIN — GLIPIZIDE 5 MG: 5 TABLET ORAL at 08:39

## 2018-01-15 ASSESSMENT — PAIN DESCRIPTION - LOCATION
LOCATION: BACK;FOOT;NECK
LOCATION: BACK;NECK;FOOT

## 2018-01-15 ASSESSMENT — PAIN SCALES - GENERAL
PAINLEVEL_OUTOF10: 8
PAINLEVEL_OUTOF10: 8

## 2018-01-15 ASSESSMENT — PAIN DESCRIPTION - PAIN TYPE
TYPE: ACUTE PAIN
TYPE: CHRONIC PAIN

## 2018-01-15 ASSESSMENT — PAIN DESCRIPTION - ORIENTATION: ORIENTATION: RIGHT;LEFT

## 2018-01-16 PROCEDURE — 1240000000 HC EMOTIONAL WELLNESS R&B

## 2018-01-16 PROCEDURE — 6370000000 HC RX 637 (ALT 250 FOR IP): Performed by: PSYCHIATRY & NEUROLOGY

## 2018-01-16 RX ADMIN — GLIPIZIDE 5 MG: 5 TABLET ORAL at 08:37

## 2018-01-16 RX ADMIN — METFORMIN HYDROCHLORIDE 1000 MG: 500 TABLET, FILM COATED ORAL at 17:31

## 2018-01-16 RX ADMIN — LITHIUM CARBONATE 600 MG: 300 TABLET, FILM COATED, EXTENDED RELEASE ORAL at 08:37

## 2018-01-16 RX ADMIN — POLYVINYL ALCOHOL 1 DROP: 14 SOLUTION/ DROPS OPHTHALMIC at 07:52

## 2018-01-16 RX ADMIN — CLONAZEPAM 0.5 MG: 0.5 TABLET ORAL at 21:58

## 2018-01-16 RX ADMIN — TRAZODONE HYDROCHLORIDE 50 MG: 50 TABLET ORAL at 21:58

## 2018-01-16 RX ADMIN — SIMVASTATIN 10 MG: 20 TABLET, FILM COATED ORAL at 21:58

## 2018-01-16 RX ADMIN — OLANZAPINE 7.5 MG: 7.5 TABLET, FILM COATED ORAL at 21:58

## 2018-01-16 RX ADMIN — METFORMIN HYDROCHLORIDE 1000 MG: 500 TABLET, FILM COATED ORAL at 08:37

## 2018-01-16 RX ADMIN — LOSARTAN POTASSIUM 50 MG: 50 TABLET, FILM COATED ORAL at 08:36

## 2018-01-16 RX ADMIN — GEMFIBROZIL 600 MG: 600 TABLET ORAL at 08:37

## 2018-01-16 RX ADMIN — CLONAZEPAM 0.5 MG: 0.5 TABLET ORAL at 08:37

## 2018-01-16 RX ADMIN — LITHIUM CARBONATE 600 MG: 300 TABLET, FILM COATED, EXTENDED RELEASE ORAL at 21:58

## 2018-01-16 RX ADMIN — GEMFIBROZIL 600 MG: 600 TABLET ORAL at 21:58

## 2018-01-16 ASSESSMENT — PAIN DESCRIPTION - ORIENTATION: ORIENTATION: RIGHT;LEFT

## 2018-01-16 ASSESSMENT — PAIN DESCRIPTION - PAIN TYPE
TYPE: CHRONIC PAIN
TYPE: CHRONIC PAIN

## 2018-01-16 ASSESSMENT — PAIN SCALES - GENERAL
PAINLEVEL_OUTOF10: 8
PAINLEVEL_OUTOF10: 8

## 2018-01-16 ASSESSMENT — PAIN DESCRIPTION - LOCATION
LOCATION: GENERALIZED
LOCATION: NECK;BACK;FOOT

## 2018-01-16 NOTE — PROGRESS NOTES
Psychiatry Progress  Note     CHIEF  COMPLAINT     Severe bipolar I disorder, most recent episode mixed, with psychotic features ([facility]        SUBJECT BHARTI AND OBJECT BHATRI:    Musa Watt is presenting with persistant depressed moods, limited participation in groups, less depressed mood than yesterday and thought blocking, moods fluctuating. with no hallucinations and thoughts are tangential. The symptoms still   present are marked in severity    . ASSESSMENT AND PLAN    Symptoms of illness and medications  discussed with pt and support provided. Progress of treatment discussed with staff     Encouraged to participate in activies and groups. His   Severe bipolar I disorder, most recent episode mixed, with psychotic features ([facility]  is gradually improving.        clonazePAM  0.5 mg Oral BID    OLANZapine  7.5 mg Oral Nightly    gemfibrozil  600 mg Oral BID    glipiZIDE  5 mg Oral Daily    lithium  600 mg Oral 2 times per day    losartan  50 mg Oral Daily    metFORMIN  1,000 mg Oral BID WC    simvastatin  10 mg Oral Nightly       polyvinyl alcohol, acetaminophen, LORazepam, traZODone, benztropine mesylate, magnesium hydroxide, aluminum & magnesium hydroxide-simethicone, loperamide, haloperidol **OR** haloperidol lactate       Elroy Schulz MD  Psychiatry

## 2018-01-16 NOTE — CARE COORDINATION
Pt's  from [facility] will pick pt up on Friday afternoon to take pt to look at group home ([address]  [place], [facility])

## 2018-01-17 ENCOUNTER — APPOINTMENT (OUTPATIENT)
Dept: GENERAL RADIOLOGY | Age: 48
DRG: 885 | End: 2018-01-17
Attending: PSYCHIATRY & NEUROLOGY
Payer: MEDICARE

## 2018-01-17 PROCEDURE — 71046 X-RAY EXAM CHEST 2 VIEWS: CPT

## 2018-01-17 PROCEDURE — 6370000000 HC RX 637 (ALT 250 FOR IP): Performed by: PSYCHIATRY & NEUROLOGY

## 2018-01-17 PROCEDURE — 1240000000 HC EMOTIONAL WELLNESS R&B

## 2018-01-17 RX ADMIN — CLONAZEPAM 0.5 MG: 0.5 TABLET ORAL at 22:07

## 2018-01-17 RX ADMIN — LITHIUM CARBONATE 600 MG: 300 TABLET, FILM COATED, EXTENDED RELEASE ORAL at 08:43

## 2018-01-17 RX ADMIN — GLIPIZIDE 5 MG: 5 TABLET ORAL at 08:43

## 2018-01-17 RX ADMIN — SIMVASTATIN 10 MG: 20 TABLET, FILM COATED ORAL at 22:04

## 2018-01-17 RX ADMIN — CLONAZEPAM 0.5 MG: 0.5 TABLET ORAL at 08:43

## 2018-01-17 RX ADMIN — LITHIUM CARBONATE 600 MG: 300 TABLET, FILM COATED, EXTENDED RELEASE ORAL at 22:07

## 2018-01-17 RX ADMIN — OLANZAPINE 7.5 MG: 7.5 TABLET, FILM COATED ORAL at 22:08

## 2018-01-17 RX ADMIN — LOSARTAN POTASSIUM 50 MG: 50 TABLET, FILM COATED ORAL at 08:43

## 2018-01-17 RX ADMIN — GEMFIBROZIL 600 MG: 600 TABLET ORAL at 22:09

## 2018-01-17 RX ADMIN — GEMFIBROZIL 600 MG: 600 TABLET ORAL at 08:43

## 2018-01-17 RX ADMIN — METFORMIN HYDROCHLORIDE 1000 MG: 500 TABLET, FILM COATED ORAL at 17:36

## 2018-01-17 RX ADMIN — METFORMIN HYDROCHLORIDE 1000 MG: 500 TABLET, FILM COATED ORAL at 08:43

## 2018-01-17 ASSESSMENT — PAIN DESCRIPTION - PAIN TYPE: TYPE: CHRONIC PAIN

## 2018-01-17 ASSESSMENT — PAIN DESCRIPTION - LOCATION: LOCATION: BACK;NECK

## 2018-01-17 ASSESSMENT — PAIN SCALES - GENERAL: PAINLEVEL_OUTOF10: 7

## 2018-01-18 PROCEDURE — 1240000000 HC EMOTIONAL WELLNESS R&B

## 2018-01-18 PROCEDURE — 6370000000 HC RX 637 (ALT 250 FOR IP): Performed by: PSYCHIATRY & NEUROLOGY

## 2018-01-18 RX ADMIN — LITHIUM CARBONATE 600 MG: 300 TABLET, FILM COATED, EXTENDED RELEASE ORAL at 21:47

## 2018-01-18 RX ADMIN — CLONAZEPAM 0.5 MG: 0.5 TABLET ORAL at 08:42

## 2018-01-18 RX ADMIN — METFORMIN HYDROCHLORIDE 1000 MG: 500 TABLET, FILM COATED ORAL at 18:25

## 2018-01-18 RX ADMIN — GEMFIBROZIL 600 MG: 600 TABLET ORAL at 08:42

## 2018-01-18 RX ADMIN — SIMVASTATIN 10 MG: 20 TABLET, FILM COATED ORAL at 21:48

## 2018-01-18 RX ADMIN — CLONAZEPAM 0.5 MG: 0.5 TABLET ORAL at 21:47

## 2018-01-18 RX ADMIN — METFORMIN HYDROCHLORIDE 1000 MG: 500 TABLET, FILM COATED ORAL at 08:42

## 2018-01-18 RX ADMIN — LITHIUM CARBONATE 600 MG: 300 TABLET, FILM COATED, EXTENDED RELEASE ORAL at 08:42

## 2018-01-18 RX ADMIN — LOSARTAN POTASSIUM 50 MG: 50 TABLET, FILM COATED ORAL at 08:42

## 2018-01-18 RX ADMIN — GEMFIBROZIL 600 MG: 600 TABLET ORAL at 21:47

## 2018-01-18 RX ADMIN — OLANZAPINE 7.5 MG: 7.5 TABLET, FILM COATED ORAL at 21:47

## 2018-01-18 RX ADMIN — GLIPIZIDE 5 MG: 5 TABLET ORAL at 08:42

## 2018-01-18 NOTE — PROGRESS NOTES
Psychiatry Progress  Note     CHIEF  COMPLAINT     Severe bipolar I disorder, most recent episode mixed, with psychotic features ([facility]        SUBJECT BHARTI AND OBJECT BHARTI:  tloa tomorrow with     Sergey Haynes is presenting with  no suicidal thoughts and less depressed mood than yesterday moods stabilizing. with no hallucinations and thoughts are circumstantial. The symptoms still   present are moderate in severity    . ASSESSMENT AND PLAN    Symptoms of illness and medications  discussed with pt and support provided. Progress of treatment discussed with staff     Encouraged to participate in activies and groups. His   Severe bipolar I disorder, most recent episode mixed, with psychotic features ([facility]  is gradually improving.        clonazePAM  0.5 mg Oral BID    OLANZapine  7.5 mg Oral Nightly    gemfibrozil  600 mg Oral BID    glipiZIDE  5 mg Oral Daily    lithium  600 mg Oral 2 times per day    losartan  50 mg Oral Daily    metFORMIN  1,000 mg Oral BID WC    simvastatin  10 mg Oral Nightly       polyvinyl alcohol, acetaminophen, LORazepam, traZODone, benztropine mesylate, magnesium hydroxide, aluminum & magnesium hydroxide-simethicone, loperamide, haloperidol **OR** haloperidol lactate       Toni Mehta MD  Psychiatry

## 2018-01-18 NOTE — PROGRESS NOTES
Psychiatry Progress  Note     CHIEF  COMPLAINT     Severe bipolar I disorder, most recent episode mixed, with psychotic features ([facility]        SUBJECT BHARTI AND OBJECT BHARTI:    Miguel Swain is presenting with persistant depressed moods and less depressed mood than yesterday moods fluctuating. with no hallucinations and thoughts are circumstantial. The symptoms still   present are moderate in severity    . ASSESSMENT AND PLAN    Symptoms of illness and medications  discussed with pt and support provided. Progress of treatment discussed with staff     Encouraged to participate in activies and groups. His   Severe bipolar I disorder, most recent episode mixed, with psychotic features ([facility]  is gradually improving.        clonazePAM  0.5 mg Oral BID    OLANZapine  7.5 mg Oral Nightly    gemfibrozil  600 mg Oral BID    glipiZIDE  5 mg Oral Daily    lithium  600 mg Oral 2 times per day    losartan  50 mg Oral Daily    metFORMIN  1,000 mg Oral BID WC    simvastatin  10 mg Oral Nightly       polyvinyl alcohol, acetaminophen, LORazepam, traZODone, benztropine mesylate, magnesium hydroxide, aluminum & magnesium hydroxide-simethicone, loperamide, haloperidol **OR** haloperidol lactate       Selvin Vigil MD  Psychiatry

## 2018-01-19 PROCEDURE — 6370000000 HC RX 637 (ALT 250 FOR IP): Performed by: PSYCHIATRY & NEUROLOGY

## 2018-01-19 PROCEDURE — 1240000000 HC EMOTIONAL WELLNESS R&B

## 2018-01-19 RX ORDER — GLIPIZIDE 5 MG/1
5 TABLET ORAL DAILY
Qty: 14 TABLET | Refills: 0 | Status: SHIPPED | OUTPATIENT
Start: 2018-01-20

## 2018-01-19 RX ORDER — GEMFIBROZIL 600 MG/1
600 TABLET, FILM COATED ORAL 2 TIMES DAILY
Qty: 28 TABLET | Refills: 0 | Status: SHIPPED | OUTPATIENT
Start: 2018-01-19

## 2018-01-19 RX ORDER — CLONAZEPAM 0.5 MG/1
0.5 TABLET ORAL 2 TIMES DAILY
Qty: 28 TABLET | Refills: 0 | Status: SHIPPED | OUTPATIENT
Start: 2018-01-19 | End: 2018-02-02

## 2018-01-19 RX ORDER — LITHIUM CARBONATE 300 MG/1
600 TABLET, FILM COATED, EXTENDED RELEASE ORAL EVERY 12 HOURS SCHEDULED
Qty: 28 TABLET | Refills: 0 | Status: SHIPPED | OUTPATIENT
Start: 2018-01-19

## 2018-01-19 RX ORDER — OLANZAPINE 7.5 MG/1
7.5 TABLET ORAL NIGHTLY
Qty: 14 TABLET | Refills: 0 | Status: SHIPPED | OUTPATIENT
Start: 2018-01-19

## 2018-01-19 RX ORDER — TRAZODONE HYDROCHLORIDE 50 MG/1
50 TABLET ORAL NIGHTLY PRN
Qty: 14 TABLET | Refills: 0 | Status: SHIPPED | OUTPATIENT
Start: 2018-01-19

## 2018-01-19 RX ORDER — LOSARTAN POTASSIUM 50 MG/1
50 TABLET ORAL DAILY
Qty: 14 TABLET | Refills: 0 | Status: ON HOLD | OUTPATIENT
Start: 2018-01-20 | End: 2020-01-09 | Stop reason: SDUPTHER

## 2018-01-19 RX ADMIN — GLIPIZIDE 5 MG: 5 TABLET ORAL at 08:20

## 2018-01-19 RX ADMIN — METFORMIN HYDROCHLORIDE 1000 MG: 500 TABLET, FILM COATED ORAL at 08:20

## 2018-01-19 RX ADMIN — CLONAZEPAM 0.5 MG: 0.5 TABLET ORAL at 08:20

## 2018-01-19 RX ADMIN — CLONAZEPAM 0.5 MG: 0.5 TABLET ORAL at 21:50

## 2018-01-19 RX ADMIN — SIMVASTATIN 10 MG: 20 TABLET, FILM COATED ORAL at 21:50

## 2018-01-19 RX ADMIN — METFORMIN HYDROCHLORIDE 1000 MG: 500 TABLET, FILM COATED ORAL at 17:02

## 2018-01-19 RX ADMIN — GEMFIBROZIL 600 MG: 600 TABLET ORAL at 21:52

## 2018-01-19 RX ADMIN — GEMFIBROZIL 600 MG: 600 TABLET ORAL at 08:23

## 2018-01-19 RX ADMIN — ALUMINUM HYDROXIDE, MAGNESIUM HYDROXIDE, AND SIMETHICONE 30 ML: 200; 200; 20 SUSPENSION ORAL at 20:32

## 2018-01-19 RX ADMIN — LITHIUM CARBONATE 600 MG: 300 TABLET, FILM COATED, EXTENDED RELEASE ORAL at 08:20

## 2018-01-19 RX ADMIN — LOSARTAN POTASSIUM 50 MG: 50 TABLET, FILM COATED ORAL at 08:20

## 2018-01-19 RX ADMIN — OLANZAPINE 7.5 MG: 7.5 TABLET, FILM COATED ORAL at 21:51

## 2018-01-19 RX ADMIN — LITHIUM CARBONATE 600 MG: 300 TABLET, FILM COATED, EXTENDED RELEASE ORAL at 21:51

## 2018-01-19 ASSESSMENT — PAIN SCALES - GENERAL
PAINLEVEL_OUTOF10: 7
PAINLEVEL_OUTOF10: 0

## 2018-01-19 NOTE — BH NOTE
Pt was encouraged to attend afternoon Focusing on Positivity group at 4:00pm-4:45pm but pt declined. Pt will continue to be encouraged to attend groups.

## 2018-01-20 PROCEDURE — 1240000000 HC EMOTIONAL WELLNESS R&B

## 2018-01-20 PROCEDURE — 6370000000 HC RX 637 (ALT 250 FOR IP): Performed by: PSYCHIATRY & NEUROLOGY

## 2018-01-20 RX ADMIN — LOSARTAN POTASSIUM 50 MG: 50 TABLET, FILM COATED ORAL at 08:23

## 2018-01-20 RX ADMIN — LITHIUM CARBONATE 600 MG: 300 TABLET, FILM COATED, EXTENDED RELEASE ORAL at 08:24

## 2018-01-20 RX ADMIN — CLONAZEPAM 0.5 MG: 0.5 TABLET ORAL at 21:33

## 2018-01-20 RX ADMIN — CLONAZEPAM 0.5 MG: 0.5 TABLET ORAL at 08:23

## 2018-01-20 RX ADMIN — METFORMIN HYDROCHLORIDE 1000 MG: 500 TABLET, FILM COATED ORAL at 18:01

## 2018-01-20 RX ADMIN — GEMFIBROZIL 600 MG: 600 TABLET ORAL at 21:33

## 2018-01-20 RX ADMIN — OLANZAPINE 7.5 MG: 7.5 TABLET, FILM COATED ORAL at 21:33

## 2018-01-20 RX ADMIN — SIMVASTATIN 10 MG: 20 TABLET, FILM COATED ORAL at 21:33

## 2018-01-20 RX ADMIN — METFORMIN HYDROCHLORIDE 1000 MG: 500 TABLET, FILM COATED ORAL at 08:24

## 2018-01-20 RX ADMIN — GEMFIBROZIL 600 MG: 600 TABLET ORAL at 08:23

## 2018-01-20 RX ADMIN — LITHIUM CARBONATE 600 MG: 300 TABLET, FILM COATED, EXTENDED RELEASE ORAL at 21:33

## 2018-01-20 RX ADMIN — GLIPIZIDE 5 MG: 5 TABLET ORAL at 08:23

## 2018-01-20 ASSESSMENT — PAIN DESCRIPTION - PAIN TYPE: TYPE: CHRONIC PAIN

## 2018-01-20 ASSESSMENT — PAIN DESCRIPTION - LOCATION: LOCATION: GENERALIZED

## 2018-01-20 ASSESSMENT — PAIN SCALES - GENERAL: PAINLEVEL_OUTOF10: 7

## 2018-01-20 NOTE — CARE COORDINATION
1:1     Pt and writer met to discuss pt anxiety r/t possible [address] placement. Pt stated, \"Nothing ever goes right for me. . Life shouldn't be so hard\" Writer and pt discussed negative thinking     Writer contacted [facility] for [address] placement. Pt was accepted. [facility] requested to pick pt up at [place] in afternoon.  Tj will meet with pt on this date to complete paperwork

## 2018-01-20 NOTE — CARE COORDINATION
SOCIAL SERVICE NOTE: SW meets with PT request of PT and staff. PT reports that since he is going to a group home on Monday and that he was given an emergency discharge notice from his prior group home that he will not be able to retrieve his belongings. Sw reviews emergency discharge notice and with permission from PT susannah telephoned contact from [facility] Christy Gutiérrez at [phone], [employer] states that arrangements have been made through Pt's brother- in -law that PT's items are being picked up on January 27th. [employer] reports that other arrangements can be made if needed. She reports that PT's brother in law would need to contact Jaimie Lennon at [phone]. SW gave PT the contact information for Jaimie Lennon and explained the situation.

## 2018-01-21 PROCEDURE — 6370000000 HC RX 637 (ALT 250 FOR IP): Performed by: PSYCHIATRY & NEUROLOGY

## 2018-01-21 PROCEDURE — 1240000000 HC EMOTIONAL WELLNESS R&B

## 2018-01-21 RX ADMIN — OLANZAPINE 7.5 MG: 7.5 TABLET, FILM COATED ORAL at 21:59

## 2018-01-21 RX ADMIN — CLONAZEPAM 0.5 MG: 0.5 TABLET ORAL at 08:31

## 2018-01-21 RX ADMIN — LOPERAMIDE HYDROCHLORIDE 2 MG: 2 CAPSULE ORAL at 12:21

## 2018-01-21 RX ADMIN — LITHIUM CARBONATE 600 MG: 300 TABLET, FILM COATED, EXTENDED RELEASE ORAL at 21:58

## 2018-01-21 RX ADMIN — CLONAZEPAM 0.5 MG: 0.5 TABLET ORAL at 21:58

## 2018-01-21 RX ADMIN — LITHIUM CARBONATE 600 MG: 300 TABLET, FILM COATED, EXTENDED RELEASE ORAL at 08:31

## 2018-01-21 RX ADMIN — METFORMIN HYDROCHLORIDE 1000 MG: 500 TABLET, FILM COATED ORAL at 08:30

## 2018-01-21 RX ADMIN — METFORMIN HYDROCHLORIDE 1000 MG: 500 TABLET, FILM COATED ORAL at 18:25

## 2018-01-21 RX ADMIN — LOSARTAN POTASSIUM 50 MG: 50 TABLET, FILM COATED ORAL at 08:34

## 2018-01-21 RX ADMIN — GEMFIBROZIL 600 MG: 600 TABLET ORAL at 08:31

## 2018-01-21 RX ADMIN — GLIPIZIDE 5 MG: 5 TABLET ORAL at 08:34

## 2018-01-21 RX ADMIN — GEMFIBROZIL 600 MG: 600 TABLET ORAL at 21:58

## 2018-01-21 RX ADMIN — SIMVASTATIN 10 MG: 20 TABLET, FILM COATED ORAL at 21:58

## 2018-01-21 ASSESSMENT — PAIN DESCRIPTION - PAIN TYPE
TYPE: CHRONIC PAIN
TYPE: CHRONIC PAIN

## 2018-01-21 ASSESSMENT — PAIN DESCRIPTION - ORIENTATION: ORIENTATION: RIGHT;LEFT

## 2018-01-21 ASSESSMENT — PAIN DESCRIPTION - DESCRIPTORS: DESCRIPTORS: ACHING;CONSTANT

## 2018-01-21 ASSESSMENT — PAIN SCALES - GENERAL
PAINLEVEL_OUTOF10: 7
PAINLEVEL_OUTOF10: 7

## 2018-01-21 ASSESSMENT — PAIN DESCRIPTION - LOCATION: LOCATION: BACK;NECK;FOOT

## 2018-01-21 NOTE — CARE COORDINATION
1: 1 COMPLETED    Writer met with PT and completed [address]. and Addiction Services: [facility].

## 2018-01-21 NOTE — PLAN OF CARE
Problem: Altered Mood, Psychotic Behavior  Goal: LTG-Able to verbalize reality based thinking  Mostly reality based w/suspicious theme to thoughts t/o.
Problem: Altered Mood, Psychotic Behavior  Goal: LTG-Able to verbalize reality based thinking  Outcome: Ongoing  PSYCHOTHERAPY GROUP NOTE    Date: 1/8/17  Start Time: 10 AM  End Time: 1050 AM    Number Participants in Group:  4     Goal:  Patient will demonstrate increased interpersonal interaction   Topic: Support     Discipline Responsible:   OT  AT x SW  Nsg.  RT MHP Other       Participation Level:     None  Minimal    Active Listener x Interactive    Monopolizing         Participation Quality:  x Appropriate  Inappropriate          Attentive        Intrusive   x       Sharing        Resistant   x       Supportive        Lethargic       Affective:   x Congruent  Incongruent  Blunted  Flat    Constricted  Anxious  Elated  Angry    Labile  Depressed  Other         Cognitive:  x Alert x Oriented PPTP     Concentration  G x F  P   Attention Span  G x F  P   Short-Term Memory  G x F  P   Long-Term Memory  G x F  P   ProblemSolving/  Decision Making  G x F  P   Ability to Process  Information  G x F  P      Contributing Factors             Delusional             Hallucinating   x          Flight of Ideas             Other:       Modes of Intervention:   Education x Support  Exploration    Clarifying  Problem Solving  Confrontation    Socialization  Limit Setting  Reality Testing    Activity  Movement  Media    Other:            Response to Learning:  x Able to verbalize current knowledge/experience    Able to verbalize/acknowledge new learning    Able to retain information   x Capable of insight    Able to change behavior    Progressing to goal    Other:        Comments:
Problem: Altered Mood, Psychotic Behavior  Goal: LTG-Able to verbalize reality based thinking  Outcome: Ongoing  Psychoeducation Group Note    Date: 1/4/18  Start Time: 1330  End Time: 1430    Number Participants in Group:  7    Goal:  Patient will demonstrate increased interpersonal interaction.    Topic:  Positive Thinking Group    Discipline Responsible:   OT  AT  [facility]. X RT  Other       Participation Level:     None  Minimal   X Active Listener X Interactive    Monopolizing         Participation Quality:  X Appropriate  Inappropriate   X       Attentive        Intrusive   X       Sharing        Resistant   X       Supportive        Lethargic       Affective:   X Congruent  Incongruent  Blunted  Flat    Constricted  Anxious  Elated  Angry    Labile  Depressed  Other  Bright       Cognitive:  X Alert X Oriented PPTP     Concentration X G  F  P   Attention Span X G  F  P   Short-Term Memory X G  F  P   Long-Term Memory X G  F  P   ProblemSolving/  Decision Making X G  F  P   Ability to Process  Information X G  F  P      Contributing Factors             Delusional             Hallucinating             Flight of Ideas             Other:       Modes of Intervention:   Education X Support X Exploration   X Clarifying X Problem Solving  Confrontation   X Socialization X Limit Setting  Reality Testing   X Activity  Movement  Media    Other:            Response to Learning:  X Able to verbalize current knowledge/experience   X Able to verbalize/acknowledge new learning   X Able to retain information   X Capable of insight    Able to change behavior   X Progressing to goal    Other:        Comments:
Problem: Altered Mood, Psychotic Behavior  Goal: LTG-Able to verbalize reality based thinking  Outcome: Ongoing  Psychoeducation Group Note    Date: 1/7/18  Start Time: 1350  End Time: 1430    Number Participants in Group:  12    Goal:  Patient will demonstrate increased interpersonal interaction. Topic:   Cognitive Skills Therapeutic Group     Discipline Responsible:   OT  AT  [facility]. X RT  Other       Participation Level:     None X Minimal    Active Listener X Interactive    Monopolizing         Participation Quality:  X Appropriate  Inappropriate          Attentive        Intrusive          Sharing        Resistant          Supportive        Lethargic       Affective:   X Congruent  Incongruent  Blunted  Flat    Constricted  Anxious  Elated  Angry    Labile  Depressed  Other  Bright       Cognitive:  X Alert X Oriented PPTP     Concentration X G  F  P   Attention Span X G  F  P   Short-Term Memory X G  F  P   Long-Term Memory X G  F  P   ProblemSolving/  Decision Making X G  F  P   Ability to Process  Information X G  F  P      Contributing Factors             Delusional             Hallucinating             Flight of Ideas             Other:       Modes of Intervention:  X Education X Support X Exploration   X Clarifying X Problem Solving  Confrontation   X Socialization X Limit Setting  Reality Testing   X Activity  Movement  Media    Other:            Response to Learning:  X Able to verbalize current knowledge/experience    Able to verbalize/acknowledge new learning    Able to retain information    Capable of insight    Able to change behavior   X Progressing to goal    Other:        Comments: Pt was present in group and engaged with peers but did not participate in activity.
Problem: Altered Mood, Psychotic Behavior  Goal: LTG-Able to verbalize reality based thinking  Outcome: Ongoing  Pt did not participate in Communication / Social Skills / Positive Thinking group at 1430 despite staff encouragement.
Problem: Altered Mood, Psychotic Behavior  Goal: LTG-Able to verbalize reality based thinking  Outcome: Ongoing  Pt did not participate in Goal Setting / Community Meeting group at 0900 despite staff encouragement.
Problem: Altered Mood, Psychotic Behavior  Goal: LTG-Able to verbalize reality based thinking  Outcome: Ongoing  Pt did not participate in Goal Setting and [employer] [facility] [employer] despite staff encouragement.
Problem: Altered Mood, Psychotic Behavior  Goal: LTG-Able to verbalize reality based thinking  Outcome: Ongoing  Pt did not participate in Goal Setting and Community Meeting at 0900 despite staff encouragement.
Problem: Altered Mood, Psychotic Behavior  Goal: LTG-Able to verbalize reality based thinking  Outcome: Ongoing  Pt did not participate in Recovery Group Activity at 12 despite staff encouragement.
Problem: Altered Mood, Psychotic Behavior  Goal: LTG-Able to verbalize reality based thinking  Outcome: Ongoing  Pt did not participate in Recovery Group at 1430 despite staff encouragement.
Problem: Altered Mood, Psychotic Behavior  Goal: LTG-Able to verbalize reality based thinking  Outcome: Ongoing  Pt did not participate in Therapeutic Leisure Skills Group at 1100 despite staff encouragement.
Problem: Altered Mood, Psychotic Behavior  Goal: LTG-Able to verbalize reality based thinking  Outcome: Ongoing  Pt did not participate in Therapeutic Leisure Skills group at 1100 despite staff encouragement.
Problem: Altered Mood, Psychotic Behavior  Goal: LTG-Able to verbalize reality based thinking  Outcome: Ongoing  Pt.denies SI/HI/Av at this time/  Q 15 min check maintained for safety. Pt.admits to depression & anxiety at this time. Pt.encouraged to explore coping skills for anxiety.
Problem: Altered Mood, Psychotic Behavior  Goal: LTG-Able to verbalize reality based thinking  Outcome: Ongoing  Pts thoughts remain loose, with a flight of ideas, very loud/pressured speech. Negative thought process. Easily angered at times. Denies any SI or hallucinations.
Problem: Altered Mood, Psychotic Behavior  Goal: LTG-Able to verbalize reality based thinking  Reality based & less suspicious.
Problem: Altered Mood, Psychotic Behavior  Goal: STG-Medication therapy compliance  Outcome: Ongoing  Pt takes meds as ordered. Remains isolative, coming out for meals and short intervals/phone calls.
Problem: Altered Mood, Psychotic Behavior  Intervention: Group participation promotion  Pt declined to attend psychotherapy at 1000 am despite encouragement.   Pt offered 1:1 and refused
Problem: Depressive Behavior with or without Suicide precautions  Goal: LTG-Able to verbalize and/or display a decrease in depressive symptoms  Anxious looking forward to discharge as he looked @ a group home today stating depression is under control @ this point stating importance of med compliance.
Problem: Depressive Behavior with or without Suicide precautions  Goal: LTG-Able to verbalize and/or display a decrease in depressive symptoms  Less depressed w/more stable mood tonight.
Problem: Depressive Behavior with or without Suicide precautions  Goal: LTG-Able to verbalize and/or display a decrease in depressive symptoms  Less depressed, pleasant/social.
Problem: Depressive Behavior with or without Suicide precautions  Goal: LTG-Able to verbalize and/or display a decrease in depressive symptoms  Outcome: Ongoing  . Pt is visible in the milieu social with staff and peers. He attends group eats well at snack and accepts all medication. He denies SI, HI, and hallucinations at this time. He is looking forward to seeing the new group home and is hoping he can be discharged soon    Goal: STG-Absence of Self Harm  Outcome: Ongoing  Pt denies si and verbally agrees to remain safe while on the unit.  No self harming behaviors are noted this shift
Problem: Depressive Behavior with or without Suicide precautions  Goal: LTG-Able to verbalize and/or display a decrease in depressive symptoms  Outcome: Ongoing  1:1 offered. Despite encouragement, PT denied 10:00 am psycho education group.
Problem: Depressive Behavior with or without Suicide precautions  Goal: LTG-Able to verbalize and/or display a decrease in depressive symptoms  Outcome: Ongoing  Despite encouragement, PT denied 10:00 am psycho education group. 1:1 offered.
Problem: Depressive Behavior with or without Suicide precautions  Goal: LTG-Able to verbalize and/or display a decrease in depressive symptoms  Outcome: Ongoing  Despite staff encouragement, PT denied 10:00 am psychoeducation group.
Problem: Depressive Behavior with or without Suicide precautions  Goal: LTG-Able to verbalize and/or display a decrease in depressive symptoms  Outcome: Ongoing  PSYCHOEDUCATION GROUP NOTE    Date: 1/14/18  Start Time: 0845  End Time: 0915    Number Participants in Group:  15    Goal:  Patient will demonstrate increased interpersonal interaction   Topic: Community meeting and goal setting    Discipline Responsible:   OT  AT  [facility]. x RT MHP Other       Participation Level:     None  Minimal    Active Listener x Interactive    Monopolizing         Participation Quality:  x Appropriate  Inappropriate          Attentive        Intrusive          Sharing x       Resistant          Supportive        Lethargic       Affective:    Congruent  Incongruent  Blunted  Flat   x Constricted  Anxious x Elated  Angry    Labile  Depressed  Other         Cognitive:  x Alert x Oriented PPTP     Concentration  G  F x P   Attention Span  G  F x P   Short-Term Memory  G x F  P   Long-Term Memory  G x F  P   ProblemSolving/  Decision Making  G x F  P   Ability to Process  Information  G x F  P      Contributing Factors             Delusional             Hallucinating             Flight of Ideas             Other:       Modes of Intervention:  x Education x Support x Exploration    Clarifying  Problem Solving  Confrontation   x Socialization x Limit Setting  Reality Testing   x Activity  Movement  Media    Other:            Response to Learning:  x Able to verbalize current knowledge/experience   x Able to verbalize/acknowledge new learning   x Able to retain information    Capable of insight   x Able to change behavior   x Progressing to goal    Other:        Comments: Pt attended group and participated.
Problem: Depressive Behavior with or without Suicide precautions  Goal: LTG-Able to verbalize and/or display a decrease in depressive symptoms  Outcome: Ongoing  Patient denies thoughts of self harm agrees to seek staff should negative thoughts arise  Patient aloof of peers in the milieu  Attends select group programming  Takes all medications as prescribed  Patient did sign in this shift and is less irritable  Patient redirects easily with staff encouragement  q15 minute visual safety checks continue per unit protocol
Problem: Depressive Behavior with or without Suicide precautions  Goal: LTG-Able to verbalize and/or display a decrease in depressive symptoms  Outcome: Ongoing  Patient is calm, loud and is medication compliant. Patient denies suicidal ideations but reports feelings of depression and anxiety. Patient is flat, irritable and is focused on group home housing but is redirectable. Patient reports eating and sleeping adequately with safety checks Q15min and at irregular intervals. Patient attends select groups and patient safety is maintained.   Goal: STG-Absence of Self Harm  Outcome: Ongoing      Problem: Altered Mood, Psychotic Behavior  Goal: LTG-Able to verbalize reality based thinking  Outcome: Ongoing    Goal: STG-Medication therapy compliance  Outcome: Ongoing
Problem: Depressive Behavior with or without Suicide precautions  Goal: LTG-Able to verbalize and/or display a decrease in depressive symptoms  Outcome: Ongoing  Patient is calm, medication compliant but irritable. Patient denies suicidal ideations but appears flat, blunt and reports depression and anxiety. Patient can be loud with pressured speech, reports eating and sleeping adequately with safety checks Q15min and at irregular intervals. Patient safety is maintained.   Goal: STG-Absence of Self Harm  Outcome: Ongoing      Problem: Pain:  Goal: Pain level will decrease  Pain level will decrease   Outcome: Ongoing    Goal: Control of acute pain  Control of acute pain   Outcome: Ongoing      Problem: Altered Mood, Psychotic Behavior  Goal: LTG-Able to verbalize reality based thinking  Outcome: Ongoing    Goal: STG-Medication therapy compliance  Outcome: Ongoing
Problem: Depressive Behavior with or without Suicide precautions  Goal: LTG-Able to verbalize and/or display a decrease in depressive symptoms  Outcome: Ongoing  Pt declined to attend psychotherapy at 1000 am despite encouragement. Pt offered 1:1 and /refused.
Problem: Depressive Behavior with or without Suicide precautions  Goal: LTG-Able to verbalize and/or display a decrease in depressive symptoms  Outcome: Ongoing  Pt denies thoughts of self harm and is agreeable to seeking out should thoughts of self harm arise. Safe environment maintained. Q15 minute checks for safety cont per unit policy. Will cont to monitor for safety and provides support and reassurance as needed. 1:1 with pt x ten minutes. Pt encouraged to attend unit programming and interact with peers and staff. Pt also encouraged to tend to hygiene and ADLs. Pt encouraged to discuss feelings with staff and feedback and reassurance provided. Pt is compliant with medications and shows no side effects. Pt is controlled in behaviors. CM is taking pt on [facility]LO[facility] to look and housing options for discharge planning.
Problem: Depressive Behavior with or without Suicide precautions  Goal: LTG-Able to verbalize and/or display a decrease in depressive symptoms  Outcome: Ongoing  Pt denies thoughts of self harm and is agreeable to seeking out should thoughts of self harm arise. Safe environment maintained. Q15 minute checks for safety cont per unit policy. Will cont to monitor for safety and provides support and reassurance as needed. PT remains loud and intrusive on the unit but is redirectable. PT is taking ordered medications and attends select programming.
Problem: Depressive Behavior with or without Suicide precautions  Goal: LTG-Able to verbalize and/or display a decrease in depressive symptoms  Outcome: Ongoing  Pt did not attend RT group at 1100 d/t resting in room despite staff invitation to attend.
Problem: Depressive Behavior with or without Suicide precautions  Goal: LTG-Able to verbalize and/or display a decrease in depressive symptoms  Outcome: Ongoing  Pt did not participate in Cognitive Skills Therapeutic Group at 1330. Pt was off unit for Jorge.
Problem: Depressive Behavior with or without Suicide precautions  Goal: LTG-Able to verbalize and/or display a decrease in depressive symptoms  Outcome: Ongoing  Pt did not participate in Communication Skills / Personal Opinions / Precious Alatorre group at 1330 despite staff encouragement.
Problem: Depressive Behavior with or without Suicide precautions  Goal: LTG-Able to verbalize and/or display a decrease in depressive symptoms  Outcome: Ongoing  Pt did not participate in [facility] at 1430. Pt was off unit on Jorge.
Problem: Depressive Behavior with or without Suicide precautions  Goal: LTG-Able to verbalize and/or display a decrease in depressive symptoms  Outcome: Ongoing  Pt did not participate in Recalling / Social Skills / Leisure Skills and Awareness group at [id number] [phone] despite staff encouragement.
Problem: Depressive Behavior with or without Suicide precautions  Goal: LTG-Able to verbalize and/or display a decrease in depressive symptoms  Outcome: Ongoing  Pt did not participate in Social Skills / Anger Management / Leisure Skills and Awareness group at 1330 despite staff encouragement.
Problem: Depressive Behavior with or without Suicide precautions  Goal: LTG-Able to verbalize and/or display a decrease in depressive symptoms  Outcome: Ongoing  Pt did not participate in Team Work / Leisure Skills and Awareness / Problem Solving group at 1430 despite staff encouragement.
Problem: Depressive Behavior with or without Suicide precautions  Goal: LTG-Able to verbalize and/or display a decrease in depressive symptoms  Outcome: Ongoing  Pt is visible in the milieu social with staff and peers. He attends group eats well at snack and accepts all medication. He denies SI, HI, and hallucinations at this time. He is labile, becomes agitated when discussing how he is upset that he is still in the hospital but he does accept encouragement and remains controlled    Goal: STG-Absence of Self Harm  Outcome: Ongoing  Pt denies si and verbally agrees to remain safe while on the unit.  No self harming behaviors are noted this shift
Problem: Depressive Behavior with or without Suicide precautions  Goal: LTG-Able to verbalize and/or display a decrease in depressive symptoms  Outcome: Ongoing  Pt more subdued, continues to be negative and focused on waiting on housing and frustrations with that. More controlled. No groups. Denies all.
Problem: Depressive Behavior with or without Suicide precautions  Goal: LTG-Able to verbalize and/or display a decrease in depressive symptoms  Outcome: Ongoing  Pt relates he is not depressed, just \"had a misunderstanding with the bastard \"  Goal: STG-Absence of Self Harm  Outcome: Ongoing  No self harm this shift    Problem: Altered Mood, Psychotic Behavior  Goal: LTG-Able to verbalize reality based thinking  Outcome: Ongoing  Pt able to have reality based conversation with writer.    Goal: STG-Medication therapy compliance  Outcome: Ongoing  Pt med compliant
Problem: Depressive Behavior with or without Suicide precautions  Goal: LTG-Able to verbalize and/or display a decrease in depressive symptoms  Outcome: Ongoing  Pt. Is up and in milieu. Pt is loud but controlled. Medication complaint. Pt. Is preoccupied with going home. Pt. Has not made any statements regarding the police today. Less labile. On phone frequently. Pt. Remains safe on the unit. Q 15 minute checks for safety maintained. Goal: STG-Absence of Self Harm  Outcome: Ongoing  Pt denies thoughts of self harm and is agreeable to seeking out should thoughts of self harm arise. Safe environment maintained. Q15 minute checks for safety cont per unit policy. Will cont to monitor for safety and provides support and reassurance as needed. Problem: Altered Mood, Psychotic Behavior  Goal: LTG-Able to verbalize reality based thinking  Outcome: Ongoing  Pt. Is more reality based in conversation. Talks with staff about cars and traveling and is appropriate. On phone frequently. Improved self care noted. Pt. Remains safe on the unit. Q 15 minute checks for safety maintained. Goal: STG-Medication therapy compliance  Outcome: Ongoing  Pt. Has been medication complaint. Pt met with Dr. Greer Dumont and was upset he was not discharged today. Became loud and was offered a prn po med from nurse. He declines and said he will keep it together. Pt. Remains safe on the unit. Q 15 minute checks for safety maintained.
Problem: Depressive Behavior with or without Suicide precautions  Goal: LTG-Able to verbalize and/or display a decrease in depressive symptoms  Reports feeling less angry/depressed than when admitted.
Problem: Depressive Behavior with or without Suicide precautions  Goal: LTG-Able to verbalize and/or display a decrease in depressive symptoms  States his depression is much better. Going w/ tomorrow to look @ a group home.
Problem: Depressive Behavior with or without Suicide precautions  Goal: STG-Absence of Self Harm  Denies s/i, or ideations to harm self.
Problem: Depressive Behavior with or without Suicide precautions  Goal: STG-Absence of Self Harm  Outcome: Ongoing  Patient remains free from self harm. The patient denies thoughts of such and is dismissive of such. 15 minute checks maintaiened. Problem: Altered Mood, Psychotic Behavior  Goal: STG-Medication therapy compliance  Outcome: Ongoing  The patient is compliant with his medications. The patient takes his medications without complication or struggle.
Problem: Depressive Behavior with or without Suicide precautions  Goal: STG-Absence of Self Harm  Outcome: Ongoing  Pt denies any intentions to harm self; at this time, pt is free of self harm, and will continue to be free as staff continues to check on pt Q 15 minutes and random times; pt cooperative, med compliant, and controlled. Problem: Altered Mood, Psychotic Behavior  Goal: LTG-Able to verbalize reality based thinking  Outcome: Ongoing  Pt does not display reality based thinking; pt noted via 1:1 with recent and remote memory; staff continues to redirect, encourage, and support pt with efforts to verbalize reality based thinking; controlled, cooperative, med compliant. Safety maintained on shift.
Problem: Pain:  Goal: Control of acute pain  Control of acute pain   Denies current pain
Problem: Pain:  Goal: Control of acute pain  Control of acute pain   Denies current pain.
Problem: Pain:  Goal: Control of chronic pain  Control of chronic pain   Satisfied w/current interventions.
Problem: Pain:  Goal: Pain level will decrease  Pain level will decrease   Denies current pain.
No  Insight and Judgment: Poor Insight, Poor Judgment  Present Suicidal Ideation: No  Present Homicidal Ideation: No    EDUCATION:   Learner Progress Toward Treatment Goals:  Reviewed group plans and strategies for care    Method:  Group therapy, medication compliance, individualized assessments and care planning    Outcome:  Needs reinforcement    PATIENT GOALS:  Pt did not identify, to be discussed with patient within 72 hours.     PLAN/TREATMENT RECOMMENDATIONS:   Group therapy, medications compliance, goal setting, individualized assessments and care, continue to monitor pt on unit      SHORT-TERM GOALS:   Time frame for Short-Term Goals:  5-7 days    LONG-TERM GOALS:  Time frame for Long-Term Goals:  6 months    Members Present in Team Meeting:     Inocencia Garcia CTRS  Goal: STG-Absence of Self Harm  Outcome: Ongoing      Problem: Pain:  Goal: Pain level will decrease  Pain level will decrease   Outcome: Ongoing    Goal: Control of acute pain  Control of acute pain   Outcome: Ongoing    Goal: Control of chronic pain  Control of chronic pain   Outcome: Ongoing

## 2018-01-21 NOTE — CARE COORDINATION
CARE COORDINATION    PT requested writer call previous group home provider; Nalini Maria at [phone] to determine if PT was allowed to come to PT's previous residency with PT's brother-in-law on 1/27/18 to obtain PT's possessions. Joeon Omar did not answer and PT stated he will ask the  tomorrow to try again.

## 2018-01-22 VITALS
TEMPERATURE: 98.4 F | OXYGEN SATURATION: 98 % | HEIGHT: 71 IN | RESPIRATION RATE: 18 BRPM | SYSTOLIC BLOOD PRESSURE: 119 MMHG | WEIGHT: 250 LBS | HEART RATE: 99 BPM | BODY MASS INDEX: 35 KG/M2 | DIASTOLIC BLOOD PRESSURE: 74 MMHG

## 2018-01-22 PROCEDURE — 6370000000 HC RX 637 (ALT 250 FOR IP): Performed by: PSYCHIATRY & NEUROLOGY

## 2018-01-22 PROCEDURE — 5130000000 HC BRIDGE APPOINTMENT

## 2018-01-22 RX ADMIN — GLIPIZIDE 5 MG: 5 TABLET ORAL at 08:35

## 2018-01-22 RX ADMIN — LITHIUM CARBONATE 600 MG: 300 TABLET, FILM COATED, EXTENDED RELEASE ORAL at 08:35

## 2018-01-22 RX ADMIN — GEMFIBROZIL 600 MG: 600 TABLET ORAL at 08:35

## 2018-01-22 RX ADMIN — CLONAZEPAM 0.5 MG: 0.5 TABLET ORAL at 08:35

## 2018-01-22 RX ADMIN — METFORMIN HYDROCHLORIDE 1000 MG: 500 TABLET, FILM COATED ORAL at 08:35

## 2018-01-22 RX ADMIN — LOSARTAN POTASSIUM 50 MG: 50 TABLET, FILM COATED ORAL at 08:35

## 2018-01-22 ASSESSMENT — PAIN DESCRIPTION - LOCATION: LOCATION: BACK;FOOT

## 2018-01-22 ASSESSMENT — PAIN DESCRIPTION - ORIENTATION: ORIENTATION: RIGHT;LEFT

## 2018-01-22 ASSESSMENT — PAIN SCALES - GENERAL: PAINLEVEL_OUTOF10: 6

## 2018-01-22 NOTE — CARE COORDINATION
Bridge Appointment completed: Reviewed Discharge Instructions with patient. Patient verbalizes understanding and agreement with the discharge plan using the teachback method. Discharge Arrangements: [facility] and follow up with [facility] on [address] [place] [address]. Guardian notified: N/A  Discharge destination/address: Group [place]: [place] [address].  [place], [address] [place] [address]  Transported by: [address] [place] [address]

## 2018-01-22 NOTE — PROGRESS NOTES
Problem: Depressive Behavior with or without Suicide precautions  Goal: LTG-Able to verbalize and/or display a decrease in depressive symptoms  Outcome: Ongoing  Pt is visible in the milieu social with staff and peers. He attends group eats well at snack and accepts all medication. He denies SI, HI, and hallucinations at this time. He states he is ready and eager for discharge     Goal: STG-Absence of Self Harm  Outcome: Ongoing  Pt denies si and verbally agrees to remain safe while on the unit.  No self harming behaviors are noted this shift

## 2020-01-07 ENCOUNTER — APPOINTMENT (OUTPATIENT)
Dept: CT IMAGING | Age: 50
DRG: 304 | End: 2020-01-07
Payer: MEDICARE

## 2020-01-07 ENCOUNTER — APPOINTMENT (OUTPATIENT)
Dept: GENERAL RADIOLOGY | Age: 50
DRG: 304 | End: 2020-01-07
Payer: MEDICARE

## 2020-01-07 ENCOUNTER — HOSPITAL ENCOUNTER (INPATIENT)
Age: 50
LOS: 2 days | Discharge: PSYCHIATRIC OR SUBSTANCE USE FACILITY | DRG: 304 | End: 2020-01-09
Attending: EMERGENCY MEDICINE | Admitting: INTERNAL MEDICINE
Payer: MEDICARE

## 2020-01-07 PROBLEM — R55 SYNCOPE AND COLLAPSE: Status: ACTIVE | Noted: 2020-01-07

## 2020-01-07 LAB
ABSOLUTE EOS #: 0.13 K/UL (ref 0–0.44)
ABSOLUTE IMMATURE GRANULOCYTE: 0.05 K/UL (ref 0–0.3)
ABSOLUTE LYMPH #: 3.26 K/UL (ref 1.1–3.7)
ABSOLUTE MONO #: 1.09 K/UL (ref 0.1–1.2)
ALBUMIN SERPL-MCNC: 4.7 G/DL (ref 3.5–5.2)
ALBUMIN/GLOBULIN RATIO: 1.5 (ref 1–2.5)
ALP BLD-CCNC: 110 U/L (ref 40–129)
ALT SERPL-CCNC: 52 U/L (ref 5–41)
ANION GAP SERPL CALCULATED.3IONS-SCNC: 16 MMOL/L (ref 9–17)
AST SERPL-CCNC: 37 U/L
BASOPHILS # BLD: 1 % (ref 0–2)
BASOPHILS ABSOLUTE: 0.1 K/UL (ref 0–0.2)
BILIRUB SERPL-MCNC: 0.29 MG/DL (ref 0.3–1.2)
BUN BLDV-MCNC: 15 MG/DL (ref 6–20)
BUN/CREAT BLD: ABNORMAL (ref 9–20)
CALCIUM SERPL-MCNC: 9.8 MG/DL (ref 8.6–10.4)
CHLORIDE BLD-SCNC: 100 MMOL/L (ref 98–107)
CHP ED QC CHECK: YES
CO2: 20 MMOL/L (ref 20–31)
CREAT SERPL-MCNC: 0.78 MG/DL (ref 0.7–1.2)
DIFFERENTIAL TYPE: ABNORMAL
EOSINOPHILS RELATIVE PERCENT: 1 % (ref 1–4)
GFR AFRICAN AMERICAN: >60 ML/MIN
GFR NON-AFRICAN AMERICAN: >60 ML/MIN
GFR SERPL CREATININE-BSD FRML MDRD: ABNORMAL ML/MIN/{1.73_M2}
GFR SERPL CREATININE-BSD FRML MDRD: ABNORMAL ML/MIN/{1.73_M2}
GLUCOSE BLD-MCNC: 129 MG/DL
GLUCOSE BLD-MCNC: 129 MG/DL (ref 75–110)
GLUCOSE BLD-MCNC: 146 MG/DL (ref 70–99)
HCT VFR BLD CALC: 42 % (ref 40.7–50.3)
HEMOGLOBIN: 13.6 G/DL (ref 13–17)
IMMATURE GRANULOCYTES: 0 %
LITHIUM DATE LAST DOSE: ABNORMAL
LITHIUM DOSE AMOUNT: ABNORMAL
LITHIUM DOSE TIME: ABNORMAL
LITHIUM LEVEL: 0.5 MMOL/L (ref 0.6–1.2)
LYMPHOCYTES # BLD: 27 % (ref 24–43)
MCH RBC QN AUTO: 28.7 PG (ref 25.2–33.5)
MCHC RBC AUTO-ENTMCNC: 32.4 G/DL (ref 28.4–34.8)
MCV RBC AUTO: 88.6 FL (ref 82.6–102.9)
MONOCYTES # BLD: 9 % (ref 3–12)
NRBC AUTOMATED: 0 PER 100 WBC
PDW BLD-RTO: 12.8 % (ref 11.8–14.4)
PLATELET # BLD: 458 K/UL (ref 138–453)
PLATELET ESTIMATE: ABNORMAL
PMV BLD AUTO: 10.4 FL (ref 8.1–13.5)
POTASSIUM SERPL-SCNC: 4.1 MMOL/L (ref 3.7–5.3)
RBC # BLD: 4.74 M/UL (ref 4.21–5.77)
RBC # BLD: ABNORMAL 10*6/UL
SEG NEUTROPHILS: 62 % (ref 36–65)
SEGMENTED NEUTROPHILS ABSOLUTE COUNT: 7.54 K/UL (ref 1.5–8.1)
SODIUM BLD-SCNC: 136 MMOL/L (ref 135–144)
T4 TOTAL: 8.6 UG/DL (ref 4.5–12)
TOTAL PROTEIN: 7.8 G/DL (ref 6.4–8.3)
TROPONIN INTERP: NORMAL
TROPONIN T: NORMAL NG/ML
TROPONIN, HIGH SENSITIVITY: 11 NG/L (ref 0–22)
TROPONIN, HIGH SENSITIVITY: 8 NG/L (ref 0–22)
TROPONIN, HIGH SENSITIVITY: 8 NG/L (ref 0–22)
TSH SERPL DL<=0.05 MIU/L-ACNC: 2.07 MIU/L (ref 0.3–5)
WBC # BLD: 12.2 K/UL (ref 3.5–11.3)
WBC # BLD: ABNORMAL 10*3/UL

## 2020-01-07 PROCEDURE — 99222 1ST HOSP IP/OBS MODERATE 55: CPT | Performed by: NURSE PRACTITIONER

## 2020-01-07 PROCEDURE — 85025 COMPLETE CBC W/AUTO DIFF WBC: CPT

## 2020-01-07 PROCEDURE — 2500000003 HC RX 250 WO HCPCS: Performed by: STUDENT IN AN ORGANIZED HEALTH CARE EDUCATION/TRAINING PROGRAM

## 2020-01-07 PROCEDURE — 72125 CT NECK SPINE W/O DYE: CPT

## 2020-01-07 PROCEDURE — 83036 HEMOGLOBIN GLYCOSYLATED A1C: CPT

## 2020-01-07 PROCEDURE — 1200000000 HC SEMI PRIVATE

## 2020-01-07 PROCEDURE — 70450 CT HEAD/BRAIN W/O DYE: CPT

## 2020-01-07 PROCEDURE — 96374 THER/PROPH/DIAG INJ IV PUSH: CPT

## 2020-01-07 PROCEDURE — 2580000003 HC RX 258: Performed by: STUDENT IN AN ORGANIZED HEALTH CARE EDUCATION/TRAINING PROGRAM

## 2020-01-07 PROCEDURE — 71046 X-RAY EXAM CHEST 2 VIEWS: CPT

## 2020-01-07 PROCEDURE — 84443 ASSAY THYROID STIM HORMONE: CPT

## 2020-01-07 PROCEDURE — 93005 ELECTROCARDIOGRAM TRACING: CPT | Performed by: STUDENT IN AN ORGANIZED HEALTH CARE EDUCATION/TRAINING PROGRAM

## 2020-01-07 PROCEDURE — 6370000000 HC RX 637 (ALT 250 FOR IP): Performed by: NURSE PRACTITIONER

## 2020-01-07 PROCEDURE — 80053 COMPREHEN METABOLIC PANEL: CPT

## 2020-01-07 PROCEDURE — 71260 CT THORAX DX C+: CPT

## 2020-01-07 PROCEDURE — 84484 ASSAY OF TROPONIN QUANT: CPT

## 2020-01-07 PROCEDURE — 6360000004 HC RX CONTRAST MEDICATION: Performed by: STUDENT IN AN ORGANIZED HEALTH CARE EDUCATION/TRAINING PROGRAM

## 2020-01-07 PROCEDURE — 84436 ASSAY OF TOTAL THYROXINE: CPT

## 2020-01-07 PROCEDURE — 82947 ASSAY GLUCOSE BLOOD QUANT: CPT

## 2020-01-07 PROCEDURE — 83880 ASSAY OF NATRIURETIC PEPTIDE: CPT

## 2020-01-07 PROCEDURE — 80178 ASSAY OF LITHIUM: CPT

## 2020-01-07 PROCEDURE — 99285 EMERGENCY DEPT VISIT HI MDM: CPT

## 2020-01-07 RX ORDER — TRAZODONE HYDROCHLORIDE 50 MG/1
50 TABLET ORAL NIGHTLY PRN
Status: DISCONTINUED | OUTPATIENT
Start: 2020-01-07 | End: 2020-01-09 | Stop reason: HOSPADM

## 2020-01-07 RX ORDER — SODIUM CHLORIDE 0.9 % (FLUSH) 0.9 %
10 SYRINGE (ML) INJECTION EVERY 12 HOURS SCHEDULED
Status: DISCONTINUED | OUTPATIENT
Start: 2020-01-07 | End: 2020-01-09 | Stop reason: HOSPADM

## 2020-01-07 RX ORDER — POTASSIUM CHLORIDE 20 MEQ/1
40 TABLET, EXTENDED RELEASE ORAL PRN
Status: DISCONTINUED | OUTPATIENT
Start: 2020-01-07 | End: 2020-01-09 | Stop reason: HOSPADM

## 2020-01-07 RX ORDER — SODIUM CHLORIDE 9 MG/ML
INJECTION, SOLUTION INTRAVENOUS CONTINUOUS
Status: DISCONTINUED | OUTPATIENT
Start: 2020-01-07 | End: 2020-01-09 | Stop reason: HOSPADM

## 2020-01-07 RX ORDER — GEMFIBROZIL 600 MG/1
600 TABLET, FILM COATED ORAL 2 TIMES DAILY
Status: DISCONTINUED | OUTPATIENT
Start: 2020-01-07 | End: 2020-01-09 | Stop reason: HOSPADM

## 2020-01-07 RX ORDER — NICOTINE POLACRILEX 4 MG
15 LOZENGE BUCCAL PRN
Status: DISCONTINUED | OUTPATIENT
Start: 2020-01-07 | End: 2020-01-09 | Stop reason: HOSPADM

## 2020-01-07 RX ORDER — POTASSIUM CHLORIDE 7.45 MG/ML
10 INJECTION INTRAVENOUS PRN
Status: DISCONTINUED | OUTPATIENT
Start: 2020-01-07 | End: 2020-01-09 | Stop reason: HOSPADM

## 2020-01-07 RX ORDER — ONDANSETRON 2 MG/ML
4 INJECTION INTRAMUSCULAR; INTRAVENOUS EVERY 6 HOURS PRN
Status: DISCONTINUED | OUTPATIENT
Start: 2020-01-07 | End: 2020-01-09 | Stop reason: HOSPADM

## 2020-01-07 RX ORDER — LOSARTAN POTASSIUM 50 MG/1
50 TABLET ORAL DAILY
Status: DISCONTINUED | OUTPATIENT
Start: 2020-01-08 | End: 2020-01-09 | Stop reason: HOSPADM

## 2020-01-07 RX ORDER — OLANZAPINE 7.5 MG/1
7.5 TABLET ORAL NIGHTLY
Status: DISCONTINUED | OUTPATIENT
Start: 2020-01-07 | End: 2020-01-09 | Stop reason: HOSPADM

## 2020-01-07 RX ORDER — LOVASTATIN 20 MG/1
20 TABLET ORAL DAILY
Status: DISCONTINUED | OUTPATIENT
Start: 2020-01-08 | End: 2020-01-09 | Stop reason: HOSPADM

## 2020-01-07 RX ORDER — CLONAZEPAM 1 MG/1
0.5 TABLET ORAL 2 TIMES DAILY
Status: DISCONTINUED | OUTPATIENT
Start: 2020-01-07 | End: 2020-01-09 | Stop reason: HOSPADM

## 2020-01-07 RX ORDER — ACETAMINOPHEN 325 MG/1
650 TABLET ORAL EVERY 4 HOURS PRN
Status: DISCONTINUED | OUTPATIENT
Start: 2020-01-07 | End: 2020-01-09 | Stop reason: HOSPADM

## 2020-01-07 RX ORDER — LABETALOL HYDROCHLORIDE 5 MG/ML
10 INJECTION, SOLUTION INTRAVENOUS ONCE
Status: COMPLETED | OUTPATIENT
Start: 2020-01-07 | End: 2020-01-07

## 2020-01-07 RX ORDER — DEXTROSE MONOHYDRATE 50 MG/ML
100 INJECTION, SOLUTION INTRAVENOUS PRN
Status: DISCONTINUED | OUTPATIENT
Start: 2020-01-07 | End: 2020-01-09 | Stop reason: HOSPADM

## 2020-01-07 RX ORDER — DEXTROSE MONOHYDRATE 25 G/50ML
12.5 INJECTION, SOLUTION INTRAVENOUS PRN
Status: DISCONTINUED | OUTPATIENT
Start: 2020-01-07 | End: 2020-01-09 | Stop reason: HOSPADM

## 2020-01-07 RX ORDER — 0.9 % SODIUM CHLORIDE 0.9 %
1000 INTRAVENOUS SOLUTION INTRAVENOUS ONCE
Status: COMPLETED | OUTPATIENT
Start: 2020-01-07 | End: 2020-01-07

## 2020-01-07 RX ORDER — MAGNESIUM SULFATE 1 G/100ML
1 INJECTION INTRAVENOUS PRN
Status: DISCONTINUED | OUTPATIENT
Start: 2020-01-07 | End: 2020-01-09 | Stop reason: HOSPADM

## 2020-01-07 RX ORDER — SODIUM CHLORIDE 0.9 % (FLUSH) 0.9 %
10 SYRINGE (ML) INJECTION PRN
Status: DISCONTINUED | OUTPATIENT
Start: 2020-01-07 | End: 2020-01-09 | Stop reason: HOSPADM

## 2020-01-07 RX ORDER — LITHIUM CARBONATE 300 MG/1
600 TABLET, FILM COATED, EXTENDED RELEASE ORAL EVERY 12 HOURS SCHEDULED
Status: DISCONTINUED | OUTPATIENT
Start: 2020-01-07 | End: 2020-01-09 | Stop reason: HOSPADM

## 2020-01-07 RX ORDER — NICOTINE 21 MG/24HR
1 PATCH, TRANSDERMAL 24 HOURS TRANSDERMAL DAILY PRN
Status: DISCONTINUED | OUTPATIENT
Start: 2020-01-07 | End: 2020-01-09 | Stop reason: HOSPADM

## 2020-01-07 RX ADMIN — OLANZAPINE 7.5 MG: 7.5 TABLET, FILM COATED ORAL at 23:29

## 2020-01-07 RX ADMIN — LITHIUM CARBONATE 600 MG: 300 TABLET, FILM COATED, EXTENDED RELEASE ORAL at 23:29

## 2020-01-07 RX ADMIN — TRAZODONE HYDROCHLORIDE 50 MG: 50 TABLET ORAL at 23:29

## 2020-01-07 RX ADMIN — Medication 10 MG: at 21:36

## 2020-01-07 RX ADMIN — IOHEXOL 75 ML: 350 INJECTION, SOLUTION INTRAVENOUS at 20:56

## 2020-01-07 RX ADMIN — CLONAZEPAM 0.5 MG: 1 TABLET ORAL at 23:30

## 2020-01-07 RX ADMIN — SODIUM CHLORIDE 1000 ML: 9 INJECTION, SOLUTION INTRAVENOUS at 18:25

## 2020-01-07 RX ADMIN — GEMFIBROZIL 600 MG: 600 TABLET ORAL at 23:29

## 2020-01-07 ASSESSMENT — ENCOUNTER SYMPTOMS
BACK PAIN: 0
VOMITING: 0
EYE PAIN: 0
SORE THROAT: 0
SHORTNESS OF BREATH: 0
NAUSEA: 0
ABDOMINAL PAIN: 0

## 2020-01-07 ASSESSMENT — PAIN DESCRIPTION - PAIN TYPE: TYPE: ACUTE PAIN

## 2020-01-07 ASSESSMENT — PAIN DESCRIPTION - FREQUENCY: FREQUENCY: CONTINUOUS

## 2020-01-07 ASSESSMENT — PAIN SCALES - GENERAL: PAINLEVEL_OUTOF10: 10

## 2020-01-07 ASSESSMENT — PAIN DESCRIPTION - DESCRIPTORS: DESCRIPTORS: THROBBING

## 2020-01-07 ASSESSMENT — PAIN DESCRIPTION - LOCATION: LOCATION: BACK;FOOT

## 2020-01-07 ASSESSMENT — PAIN DESCRIPTION - ORIENTATION: ORIENTATION: RIGHT;LEFT

## 2020-01-07 NOTE — ED NOTES
Bed: 26  Expected date:   Expected time:   Means of arrival:   Comments:  [address]  01/07/20 4534

## 2020-01-07 NOTE — ED NOTES
Pt arrived to ED alert and oriented x4 via Medic 25. Pt c/o back and foot pain, had syncopal episode PTA. Pt reports that he was sitting in a chair, felt dizzy, and \"went out\". Pt reports positive LOC, denies hitting head. Pt c/o back pain and bilateral foot pain, states \"My shoes are too tight\". Pt denies head or neck pain, moves all extremities without difficulty. Pt placed on cardiac monitor, continuous pulse ox, and BP cuff. EKG done. RR even and unlabored. NAD noted. Will continue monitor.      Dennis Edgar RN  01/07/20 [id number]

## 2020-01-07 NOTE — ED PROVIDER NOTES
[facility] ED  Emergency Department Encounter  Emergency Medicine Resident     Pt Name: Alanna Wagner  LFA:[id number]  [place] 1970  Date of evaluation: 1/7/20  PCP:  No primary care provider on file. CHIEF COMPLAINT       Chief Complaint   Patient presents with    Foot Pain    Loss of Consciousness       HISTORY OF PRESENT ILLNESS  (Location/Symptom, Timing/Onset, Context/Setting, Quality, Duration, ModifyingFactors, Severity.)      Alanna Wagner is a 54 y.o. male with PMH of bipolar I on lithium, HTN, HLD, DM presents with syncopal episode. [facility], was sitting in a chair, had a witnessed syncopal episode via staff, fell to the ground, recalls the events. Denies hitting his head. Was not amnesic to the events. Has had lightheadedness all day, but no prodromal symptoms immediately to the episode. Patient states he had this before several years ago. He is complaining of only bilateral foot pain which he states is due to his shoes being worn out, pain is all over, no new trauma to his feet. He denies fevers chills, headache, visual changes, myalgias, cough, sore throat, chest pain, shortness of breath, neck pain, abdominal pain, nausea or vomiting, incontinence, dysuria, hematuria. Patient denies alcohol or drug use, does not smoke, has been compliant with his medications including lithium. PAST MEDICAL / SURGICAL / SOCIAL /FAMILY HISTORY      has a past medical history of Bipolar 1 disorder ([facility] [address] Diabetes mellitus ([facility] [address] and Hyperlipidemia. has a past surgical history that includes Tonsillectomy.     Social History     Socioeconomic History    Marital status: Single     Spouse name: Not on file    Number of children: Not on file    Years of education: Not on file    Highest education level: Not on file   Occupational History    Not on file   Social Needs    Financial resource strain: Not on file    Food insecurity:     Worry: Not on file Inability: Not on file    Transportation needs:     Medical: Not on file     Non-medical: Not on file   Tobacco Use    Smoking status: Never Smoker    Smokeless tobacco: Never Used   Substance and Sexual Activity    Alcohol use: No    Drug use: No    Sexual activity: Not on file   Lifestyle    Physical activity:     Days per week: Not on file     Minutes per session: Not on file    Stress: Not on file   Relationships    Social connections:     Talks on phone: Not on file     Gets together: Not on file     Attends Faith service: Not on file     Active member of club or organization: Not on file     Attends meetings of clubs or organizations: Not on file     Relationship status: Not on file    Intimate partner violence:     Fear of current or ex partner: Not on file     Emotionally abused: Not on file     Physically abused: Not on file     Forced sexual activity: Not on file   Other Topics Concern    Not on file   Social History Narrative    Not on file       I counseled the patient against using tobacco products. Family History   Problem Relation Age of Onset    Bipolar Disorder Father     Mental Illness Paternal Grandmother        Allergies:  Erythromycin and Pcn [penicillins]    Home Medications:  Prior to Admission medications    Medication Sig Start Date End Date Taking?  Authorizing Provider   glipiZIDE (GLUCOTROL) 5 MG tablet Take 1 tablet by mouth daily 1/20/18  Yes Cheryl Covarrubias MD   metFORMIN (GLUCOPHAGE) 1000 MG tablet Take 1 tablet by mouth 2 times daily (with meals) 1/19/18  Yes Cheryl Covarrubias MD   gemfibrozil (LOPID) 600 MG tablet Take 1 tablet by mouth 2 times daily 1/19/18  Yes Cheryl Covarrubias MD   losartan (COZAAR) 50 MG tablet Take 1 tablet by mouth daily 1/20/18  Yes Cheryl Covarrubias MD   lithium (LITHOBID) 300 MG extended release tablet Take 2 tablets by mouth every 12 hours 1/19/18  Yes Cheryl Covarrubias MD   lovastatin (MEVACOR) 20 MG tablet Take 20 mg without Reflex    T4    Troponin    LITHIUM LEVEL    Basic Metabolic Panel w/ Reflex to MG    Magnesium    Troponin    Brain natriuretic peptide    CBC    Hemoglobin A1c    Vital signs per unit routine    Telemetry monitoring    Orthostatic blood pressure and pulse    Up with assistance    Neuro checks    Tobacco cessation education protocol    Place intermittent pneumatic compression device    HYPOGLYCEMIA TREATMENT: blood glucose less than 50 mg/dL and patient  ALERT and TOLERATING PO    HYPOGLYCEMIA TREATMENT: blood glucose less than 70 mg/dL and patient ALERT and TOLERATING PO    HYPOGLYCEMIA TREATMENT: blood glucose less than 70 mg/dL and patient NOT ALERT or NPO    Notify Provider    Notify physician    Full Code    Inpatient consult to Hospitalist    Inpatient consult to Social Work    OT eval and treat    PT evaluation and treat    Initiate Oxygen Therapy Protocol    POCT glucose    POCT Glucose    POC Glucose Fingerstick    EKG 12 Lead    EKG 12 lead    PATIENT STATUS (FROM ED OR OR/PROCEDURAL) Inpatient       MEDICATIONS ORDERED:  Orders Placed This Encounter   Medications    0.9 % sodium chloride bolus    labetalol (NORMODYNE;TRANDATE) injection 10 mg    iohexol (OMNIPAQUE 350) solution 75 mL    clonazePAM (KLONOPIN) tablet 0.5 mg    gemfibrozil (LOPID) tablet 600 mg    lithium (LITHOBID) extended release tablet 600 mg    losartan (COZAAR) tablet 50 mg    lovastatin (MEVACOR) tablet 20 mg     Order Specific Question:   Please select a reason the therapeutic interchange was not accepted.  If you did not receive the alert pop-up, patient has a medication order that is contraindicated with simvastatin:     Answer:   Risk of Drug-Drug Interactions    OLANZapine (ZYPREXA) tablet 7.5 mg    traZODone (DESYREL) tablet 50 mg    0.9 % sodium chloride infusion    sodium chloride flush 0.9 % injection 10 mL    sodium chloride flush 0.9 % injection 10 mL    OR Linked Order sounds, no abdominal tenderness, no tenderness around ankle or base of fifth metatarsal.  Low suspicion for fracture to foot or ankle, C-spine. Will obtain cardiac work-up as well as CT head and neck. Low suspicion for ICH. Possibly hypertensive syncope versus encephalopathy as patient is rather manic. Will treat hypertension and plan for admission. RADIOLOGY:  CT HEAD WO CONTRAST   Final Result   Negative CT brain with no acute intracranial abnormality. CT CERVICAL SPINE WO CONTRAST   Final Result   No acute abnormality of the cervical spine. CT CHEST W CONTRAST   Final Result   No acute pulmonary process. No abnormality corresponding to radiographic findings. XR CHEST STANDARD (2 VW)   Final Result   1. Mild right hilar prominence which is nonspecific and may relate to   underlying adenopathy, vascular prominence, airspace disease or possibly   mass. Consider further assessment with contrast-enhanced chest CT. 2. Left basilar/retrocardiac likely subsegmental atelectasis. 3. Slight elevation right hemidiaphragm               EKG  EKG Interpretation    Interpreted by emergency department physician    Rhythm: normal sinus   Rate: normal  Axis: left  Ectopy: none  Conduction: normal  ST Segments: no acute change  T Waves: no acute change  Q Waves: none    Clinical Impression: no acute changes    Abdiaziz Reed      All EKG's are interpreted by the Emergency Department Physician who either signs or Co-signs this chart in the absence of a cardiologist.    EMERGENCY DEPARTMENT COURSE:       PROCEDURES:      CONSULTS:  IP CONSULT TO HOSPITALIST  IP CONSULT TO SOCIAL WORK        FINAL IMPRESSION      1. Hypertensive urgency    2. Syncope and collapse    3. Lithium use          DISPOSITION / PLAN     DISPOSITION  DISPOSITION Admitted 01/07/2020 10:05:02 PM        PATIENT REFERREDTO:  No follow-up provider specified.     DISCHARGE MEDICATIONS:  New Prescriptions    No medications on

## 2020-01-08 PROBLEM — I16.0 HYPERTENSIVE URGENCY: Status: ACTIVE | Noted: 2020-01-08

## 2020-01-08 LAB
ANION GAP SERPL CALCULATED.3IONS-SCNC: 10 MMOL/L (ref 9–17)
BNP INTERPRETATION: NORMAL
BUN BLDV-MCNC: 11 MG/DL (ref 6–20)
BUN/CREAT BLD: NORMAL (ref 9–20)
CALCIUM SERPL-MCNC: 8.9 MG/DL (ref 8.6–10.4)
CHLORIDE BLD-SCNC: 106 MMOL/L (ref 98–107)
CO2: 23 MMOL/L (ref 20–31)
CREAT SERPL-MCNC: 0.72 MG/DL (ref 0.7–1.2)
EKG ATRIAL RATE: 97 BPM
EKG P AXIS: 41 DEGREES
EKG P-R INTERVAL: 166 MS
EKG Q-T INTERVAL: 378 MS
EKG QRS DURATION: 106 MS
EKG QTC CALCULATION (BAZETT): 480 MS
EKG R AXIS: -47 DEGREES
EKG T AXIS: 20 DEGREES
EKG VENTRICULAR RATE: 97 BPM
ESTIMATED AVERAGE GLUCOSE: 143 MG/DL
GFR AFRICAN AMERICAN: >60 ML/MIN
GFR NON-AFRICAN AMERICAN: >60 ML/MIN
GFR SERPL CREATININE-BSD FRML MDRD: NORMAL ML/MIN/{1.73_M2}
GFR SERPL CREATININE-BSD FRML MDRD: NORMAL ML/MIN/{1.73_M2}
GLUCOSE BLD-MCNC: 108 MG/DL (ref 75–110)
GLUCOSE BLD-MCNC: 124 MG/DL (ref 75–110)
GLUCOSE BLD-MCNC: 220 MG/DL (ref 75–110)
GLUCOSE BLD-MCNC: 280 MG/DL (ref 75–110)
GLUCOSE BLD-MCNC: 99 MG/DL (ref 70–99)
HBA1C MFR BLD: 6.6 % (ref 4–6)
HCT VFR BLD CALC: 41.8 % (ref 40.7–50.3)
HEMOGLOBIN: 13 G/DL (ref 13–17)
MAGNESIUM: 2.5 MG/DL (ref 1.6–2.6)
MCH RBC QN AUTO: 28.6 PG (ref 25.2–33.5)
MCHC RBC AUTO-ENTMCNC: 31.1 G/DL (ref 28.4–34.8)
MCV RBC AUTO: 91.9 FL (ref 82.6–102.9)
NRBC AUTOMATED: 0 PER 100 WBC
PDW BLD-RTO: 12.7 % (ref 11.8–14.4)
PLATELET # BLD: 391 K/UL (ref 138–453)
PMV BLD AUTO: 10.5 FL (ref 8.1–13.5)
POTASSIUM SERPL-SCNC: 3.7 MMOL/L (ref 3.7–5.3)
PRO-BNP: <20 PG/ML
RBC # BLD: 4.55 M/UL (ref 4.21–5.77)
SODIUM BLD-SCNC: 139 MMOL/L (ref 135–144)
TOTAL CK: 247 U/L (ref 39–308)
TROPONIN INTERP: NORMAL
TROPONIN T: NORMAL NG/ML
TROPONIN, HIGH SENSITIVITY: 10 NG/L (ref 0–22)
WBC # BLD: 11.3 K/UL (ref 3.5–11.3)

## 2020-01-08 PROCEDURE — 2580000003 HC RX 258: Performed by: NURSE PRACTITIONER

## 2020-01-08 PROCEDURE — 36415 COLL VENOUS BLD VENIPUNCTURE: CPT

## 2020-01-08 PROCEDURE — 6360000002 HC RX W HCPCS: Performed by: NURSE PRACTITIONER

## 2020-01-08 PROCEDURE — 84484 ASSAY OF TROPONIN QUANT: CPT

## 2020-01-08 PROCEDURE — 93010 ELECTROCARDIOGRAM REPORT: CPT | Performed by: INTERNAL MEDICINE

## 2020-01-08 PROCEDURE — 82550 ASSAY OF CK (CPK): CPT

## 2020-01-08 PROCEDURE — 97165 OT EVAL LOW COMPLEX 30 MIN: CPT

## 2020-01-08 PROCEDURE — 99232 SBSQ HOSP IP/OBS MODERATE 35: CPT | Performed by: INTERNAL MEDICINE

## 2020-01-08 PROCEDURE — 97535 SELF CARE MNGMENT TRAINING: CPT

## 2020-01-08 PROCEDURE — 90792 PSYCH DIAG EVAL W/MED SRVCS: CPT | Performed by: NURSE PRACTITIONER

## 2020-01-08 PROCEDURE — 6370000000 HC RX 637 (ALT 250 FOR IP): Performed by: NURSE PRACTITIONER

## 2020-01-08 PROCEDURE — 85027 COMPLETE CBC AUTOMATED: CPT

## 2020-01-08 PROCEDURE — 97162 PT EVAL MOD COMPLEX 30 MIN: CPT

## 2020-01-08 PROCEDURE — 93880 EXTRACRANIAL BILAT STUDY: CPT

## 2020-01-08 PROCEDURE — 82947 ASSAY GLUCOSE BLOOD QUANT: CPT

## 2020-01-08 PROCEDURE — 80048 BASIC METABOLIC PNL TOTAL CA: CPT

## 2020-01-08 PROCEDURE — 97530 THERAPEUTIC ACTIVITIES: CPT

## 2020-01-08 PROCEDURE — 1200000000 HC SEMI PRIVATE

## 2020-01-08 PROCEDURE — 83735 ASSAY OF MAGNESIUM: CPT

## 2020-01-08 RX ORDER — DOXEPIN HYDROCHLORIDE 25 MG/1
25 CAPSULE ORAL NIGHTLY
COMMUNITY

## 2020-01-08 RX ADMIN — LITHIUM CARBONATE 600 MG: 300 TABLET, FILM COATED, EXTENDED RELEASE ORAL at 10:56

## 2020-01-08 RX ADMIN — SODIUM CHLORIDE: 9 INJECTION, SOLUTION INTRAVENOUS at 08:03

## 2020-01-08 RX ADMIN — LOSARTAN POTASSIUM 50 MG: 50 TABLET, FILM COATED ORAL at 10:56

## 2020-01-08 RX ADMIN — ENOXAPARIN SODIUM 40 MG: 40 INJECTION SUBCUTANEOUS at 10:56

## 2020-01-08 RX ADMIN — INSULIN LISPRO 2 UNITS: 100 INJECTION, SOLUTION INTRAVENOUS; SUBCUTANEOUS at 13:26

## 2020-01-08 RX ADMIN — SODIUM CHLORIDE: 9 INJECTION, SOLUTION INTRAVENOUS at 02:00

## 2020-01-08 RX ADMIN — LOVASTATIN 20 MG: 20 TABLET ORAL at 10:56

## 2020-01-08 RX ADMIN — OLANZAPINE 7.5 MG: 7.5 TABLET, FILM COATED ORAL at 21:05

## 2020-01-08 RX ADMIN — INSULIN LISPRO 3 UNITS: 100 INJECTION, SOLUTION INTRAVENOUS; SUBCUTANEOUS at 18:21

## 2020-01-08 RX ADMIN — CLONAZEPAM 0.5 MG: 1 TABLET ORAL at 21:05

## 2020-01-08 RX ADMIN — GEMFIBROZIL 600 MG: 600 TABLET ORAL at 10:56

## 2020-01-08 RX ADMIN — LITHIUM CARBONATE 600 MG: 300 TABLET, FILM COATED, EXTENDED RELEASE ORAL at 21:05

## 2020-01-08 RX ADMIN — GEMFIBROZIL 600 MG: 600 TABLET ORAL at 21:06

## 2020-01-08 ASSESSMENT — PAIN DESCRIPTION - ORIENTATION
ORIENTATION: RIGHT;LEFT
ORIENTATION: RIGHT;LEFT

## 2020-01-08 ASSESSMENT — PAIN DESCRIPTION - PAIN TYPE: TYPE: ACUTE PAIN

## 2020-01-08 ASSESSMENT — PAIN SCALES - GENERAL
PAINLEVEL_OUTOF10: 10
PAINLEVEL_OUTOF10: 10

## 2020-01-08 ASSESSMENT — PAIN DESCRIPTION - LOCATION
LOCATION: BACK;FOOT
LOCATION: FOOT

## 2020-01-08 NOTE — VIRTUAL HEALTH
Consults  Department of Psychiatry  Consult Service  Advanced Practice Nurse Psychiatric Assessment    Reason for Consult:  Bipolar disorder    Patient Location:  [address] Onc/Med Surg    Provider Location ([facility]/State): [place], [place]     History obtained from:  patient, electronic medical record    HISTORY OF PRESENT ILLNESS:          The patient is a 54 y.o. male with significant past medical history of bipolar disorder who is admitted medically for treatment of hypertensive urgency after presented to the ED last evening with CC of foot pain and altered level of conciousness. The patient had a witnessed syncopal episode with subsequent fall to the ground. The patient reports that he is uncertain why consult was requested today. The patient reports that his bipolar disorder has not been managed well recently. The patient reports that his symptoms have been worst lately. The patient does report that recently he was in a group home and had to move out over the holiday season because \"the state shut them down. \"  The patient states he has been frustrated because he has been having difficulty finding a new group home. The patient is noted to be tangential, with rapid and pressured speech. He is difficult to interrupt during session and redirect in thought processes. He does present with flight of ideas. The patient reports that he is currently living at [place]. The patient reports that he has gone 2.5 weeks without sleep. He does report that last night he slept 6 hours which was \"great. \"  The patient reports he feels down and depressed \"sort of.\"  He is more irritable than normal.  The patient does report elevated impulsivity. The patient denies suicidal thinking. Denies homicidal thoughts. The patient does report compliance with psychotropic medications. He expresses that he does not feel like he is in a manic spell right now \"because I'm sleeping. \"  The patient is also noted to be restless and hyperactive during this session. His father is at bedside. He expresses that he does feel that he has concern and worry regarding his son's presentation. He states that he had been doing well for the last two years until his group home was shut down. The patient's dad reports that since his group home was shut down the patient has been noted to struggle an increasing amount. At conclusion of assessment discussing disposition recommendation, the patient became quite angry and irritable. He was swearing and presented with psychomotor agitation. Patient displayed no insight into how inpatient hospitalization may be appropriate or beneficial to him. The patient did get up out of the bed and exited the room without warning or discussion. This writer did notify the staff on the unit that the patient had left the room and concern regarding possible elopement.     Current Outpatient Psychiatric Medications: lithium, doxepin    Medications:    Current Facility-Administered Medications: clonazePAM (KLONOPIN) tablet 0.5 mg, 0.5 mg, Oral, BID  gemfibrozil (LOPID) tablet 600 mg, 600 mg, Oral, BID  lithium (LITHOBID) extended release tablet 600 mg, 600 mg, Oral, 2 times per day  losartan (COZAAR) tablet 50 mg, 50 mg, Oral, Daily  lovastatin (MEVACOR) tablet 20 mg, 20 mg, Oral, Daily  OLANZapine (ZYPREXA) tablet 7.5 mg, 7.5 mg, Oral, Nightly  traZODone (DESYREL) tablet 50 mg, 50 mg, Oral, Nightly PRN  0.9 % sodium chloride infusion, , Intravenous, Continuous  sodium chloride flush 0.9 % injection 10 mL, 10 mL, Intravenous, 2 times per day  sodium chloride flush 0.9 % injection 10 mL, 10 mL, Intravenous, PRN  potassium chloride (KLOR-CON M) extended release tablet 40 mEq, 40 mEq, Oral, PRN **OR** potassium bicarb-citric acid (EFFER-K) effervescent tablet 40 mEq, 40 mEq, Oral, PRN **OR** potassium chloride 10 mEq/100 mL IVPB (Peripheral Line), 10 mEq, Intravenous, PRN  magnesium sulfate 1 g in

## 2020-01-08 NOTE — ED NOTES
Pt resting comfortably on stretcher. Respirations even and unlabored. No distress noted. Pt continues on cardiac monitor, continuous pulse ox, and BP cuff  Will cont to monitor.         Stephanie Maxwell RN  01/07/20 [id number]

## 2020-01-08 NOTE — H&P
[facility]    HISTORY AND PHYSICAL EXAMINATION            Date:   1/8/2020  Patient name:  Kamila Griffiths  Date of admission:  1/7/2020  5:31 PM  MRN:   [id number]  Account:  [de-identified]  YOB: 1970  PCP:    No primary care provider on file. Room:   26/26  Code Status:    Full Code    Chief Complaint:     Chief Complaint   Patient presents with    Foot Pain    Loss of Consciousness       History Obtained From:     patient    History of Present Illness:     Kamila Griffiths is a 54 y.o. Non-/non  male who presents with Foot Pain and Loss of Consciousness  and is admitted to the hospital for the management of Hypertensive urgency. Patient has a past medical history of bipolar disorder on lithium and [facility] and had a witnessed syncopal episode by staff while he was sitting in the chair and he fell to the ground. Patient denies loss of consciousness and is able to remember the event. He denies any injury/trauma. He states he had been feeling lightheaded all day. He also complains of generalized bilateral foot pain and states that it is because his shoes are too tight,  He denies any trauma to his feet, numbness or tingling. During ED evaluation he was hypertensive, -190's, heart rate WNL and he was treated with labetolol  EKG unremarkable, troponins negative, CT head and cervical spine negative. He reports he has been compliant with medications and treatment and denies alcohol or drug use     Lithium level 0.5  Past Medical History:     Past Medical History:   Diagnosis Date    Bipolar 1 disorder ([facility] [address]     Diabetes mellitus ([facility] [address]     Hyperlipidemia         Past Surgical History:     Past Surgical History:   Procedure Laterality Date    TONSILLECTOMY          Medications Prior to Admission:     Prior to Admission medications    Medication Sig Start Date End Date Taking?  Authorizing Provider glipiZIDE (GLUCOTROL) 5 MG tablet Take 1 tablet by mouth daily 1/20/18  Yes Bob Cueto MD   metFORMIN (GLUCOPHAGE) 1000 MG tablet Take 1 tablet by mouth 2 times daily (with meals) 1/19/18  Yes Bob Cueto MD   gemfibrozil (LOPID) 600 MG tablet Take 1 tablet by mouth 2 times daily 1/19/18  Yes Bob Cueto MD   losartan (COZAAR) 50 MG tablet Take 1 tablet by mouth daily 1/20/18  Yes Bob Cueto MD   lithium (LITHOBID) 300 MG extended release tablet Take 2 tablets by mouth every 12 hours 1/19/18  Yes Bob Cueto MD   lovastatin (MEVACOR) 20 MG tablet Take 20 mg by mouth daily   Yes Dalila Soto MD   OLANZapine (ZYPREXA) 7.5 MG tablet Take 1 tablet by mouth nightly 1/19/18   Bob Cueto MD   traZODone (DESYREL) 50 MG tablet Take 1 tablet by mouth nightly as needed for Sleep 1/19/18   Bob Cueto MD   clonazePAM (KLONOPIN) 0.5 MG tablet Take 1 tablet by mouth 2 times daily for 14 days. 1/19/18 2/2/18  Bob Cueto MD        Allergies:     Erythromycin and Pcn [penicillins]    Social History:     Tobacco:    reports that he has never smoked. He has never used smokeless tobacco.  Alcohol:      reports no history of alcohol use. Drug Use:  reports no history of drug use. Family History:     Family History   Problem Relation Age of Onset    Bipolar Disorder Father     Mental Illness Paternal Grandmother        Review of Systems:     Positive and Negative as described in HPI.     CONSTITUTIONAL:  negative for fevers, chills, sweats, fatigue, weight loss  HEENT:  negative for vision, hearing changes, runny nose, throat pain  RESPIRATORY:  negative for shortness of breath, cough, congestion, wheezing  CARDIOVASCULAR:  negative for chest pain, palpitations  GASTROINTESTINAL:  negative for nausea, vomiting, diarrhea, constipation, change in bowel habits, abdominal pain   GENITOURINARY:  negative for difficulty of urination, burning with urination, Albumin/Globulin Ratio 1.5 1.0 - 2.5    GFR Non-African American >60 >60 mL/min    GFR African American >60 >60 mL/min    GFR Comment          GFR Staging NOT REPORTED    TSH without Reflex    Collection Time: 01/07/20  6:14 PM   Result Value Ref Range    TSH 2.07 0.30 - 5.00 mIU/L   T4    Collection Time: 01/07/20  6:14 PM   Result Value Ref Range    T4, Total 8.6 4.5 - 12.0 ug/dL   Troponin    Collection Time: 01/07/20  6:14 PM   Result Value Ref Range    Troponin, High Sensitivity 8 0 - 22 ng/L    Troponin T NOT REPORTED <0.03 ng/mL    Troponin Interp NOT REPORTED    LITHIUM LEVEL    Collection Time: 01/07/20  6:14 PM   Result Value Ref Range    Lithium Lvl 0.5 (L) 0.6 - 1.2 mmol/L    Lithium Dose Amount NOT REPORTED     Lithium Date Last Dose NOT REPORTED     Lithium Dose Time NOT REPORTED    Troponin    Collection Time: 01/07/20  7:37 PM   Result Value Ref Range    Troponin, High Sensitivity 8 0 - 22 ng/L    Troponin T NOT REPORTED <0.03 ng/mL    Troponin Interp NOT REPORTED    POC Glucose Fingerstick    Collection Time: 01/07/20 10:51 PM   Result Value Ref Range    POC Glucose 129 (H) 75 - 110 mg/dL   POCT glucose    Collection Time: 01/07/20 10:52 PM   Result Value Ref Range    Glucose 129 mg/dL    QC OK? yes    Troponin    Collection Time: 01/07/20 11:10 PM   Result Value Ref Range    Troponin, High Sensitivity 11 0 - 22 ng/L    Troponin T NOT REPORTED <0.03 ng/mL    Troponin Interp NOT REPORTED    Brain natriuretic peptide    Collection Time: 01/07/20 11:10 PM   Result Value Ref Range    Pro-BNP <20 <300 pg/mL    BNP Interpretation Pro-BNP Reference Range:        Imaging/Diagnostics:  Xr Chest Standard (2 Vw)    Result Date: 1/7/2020  1. Mild right hilar prominence which is nonspecific and may relate to underlying adenopathy, vascular prominence, airspace disease or possibly mass. Consider further assessment with contrast-enhanced chest CT. 2. Left basilar/retrocardiac likely subsegmental atelectasis.  3. Slight elevation right hemidiaphragm     Ct Head Wo Contrast    Result Date: 1/7/2020  Negative CT brain with no acute intracranial abnormality. Ct Chest W Contrast    Result Date: 1/7/2020  No acute pulmonary process. No abnormality corresponding to radiographic findings. Ct Cervical Spine Wo Contrast    Result Date: 1/7/2020  No acute abnormality of the cervical spine. Assessment :      Hospital Problems           Last Modified POA    * (Principal) Hypertensive urgency 1/8/2020 Yes    Severe bipolar I disorder, most recent episode mixed, with psychotic features ([facility] [address] 1/8/2020 Yes    Syncope and collapse 1/8/2020 Yes          Plan:     Patient status inpatient in the Progressive Unit/Step down    1. Continue selected home meds including lithium   2. DVT prophylaxis  3. Monitor and control blood pressure   4. Monitor labs, replace electrolytes as needed   5. Cardiac monitoring  6. Glycemic control, sliding scale   7. PT/OT evaluation   8. Neuro checks every four hours   9. IV fluids   10. Cardiac echo  11. Consult    12. Psych consult   13. Plan discussed with patient     Consultations:   IP CONSULT TO HOSPITALIST  IP CONSULT TO SOCIAL WORK     Patient is admitted as inpatient status because of co-morbidities listed above, severity of signs and symptoms as outlined, requirement for current medical therapies and most importantly because of direct risk to patient if care not provided in a hospital setting. TRISTON VILLARREAL NP  1/8/2020  3:01 AM    Copy sent to Dr. Alejandra primary care provider on file.

## 2020-01-08 NOTE — ED PROVIDER NOTES
face to face evaluation of this patient and have completed at least one if not all key elements of the E/M (history, physical exam, and MDM). Additional findings are as noted. For APC cases I have personally evaluated and examined the patient in conjunction with the APC and agree with the treatment plan and disposition of the patient as recorded by the APC.     Alejandro Black MD  Attending Emergency  Physician       Becky Patiño MD  01/07/20 [id number]       Becky Patiño MD  01/07/20 1585

## 2020-01-08 NOTE — ED NOTES
Report recd from [facility]. Plan to admit and attempted to call report x2-prior shift left on hold.       Mohamud Stern RN  01/08/20 0323

## 2020-01-08 NOTE — ED NOTES
Attempt to call report x2 and left on hold. Requested to talk to charge and left on hold.       José Miguel Pratt RN  01/08/20 [id number]

## 2020-01-08 NOTE — ED NOTES
met with patient at bedside. Patient reported his group home was shut down by the state and he is working on getting a new one currently. He came from [facility] and reported if he is medically clear he would like to go back to [facility]. He stated that his father [facility] He stated he has been taking his medications and feels of but presented with a flat affect. He is currently concerned about his feet.        SHAHEEN Childers, LSW     Rayma Leak  01/07/20 [id number]

## 2020-01-08 NOTE — ED NOTES
Pt resting comfortably on stretcher. Respirations even and unlabored. No distress noted. Will cont to monitor.         Kajal Howard RN  01/08/20 [id number]

## 2020-01-08 NOTE — ED NOTES
Pt resting comfortably on stretcher. Respirations even and unlabored. No distress noted. Will cont to monitor.       Arnulfo Ariza RN  01/08/20 [id number]

## 2020-01-08 NOTE — ED PROVIDER NOTES
injection vial 0-6 Units    insulin lispro (HUMALOG) injection vial 0-3 Units       LABS / RADIOLOGY:     Labs Reviewed   CBC WITH AUTO DIFFERENTIAL - Abnormal; Notable for the following components:       Result Value    WBC 12.2 (*)     Platelets 051 (*)     All other components within normal limits   COMPREHENSIVE METABOLIC PANEL W/ REFLEX TO MG FOR LOW K - Abnormal; Notable for the following components:    Glucose 146 (*)     ALT 52 (*)     Total Bilirubin 0.29 (*)     All other components within normal limits   LITHIUM LEVEL - Abnormal; Notable for the following components:    Lithium Lvl 0.5 (*)     All other components within normal limits   POC GLUCOSE FINGERSTICK - Abnormal; Notable for the following components:    POC Glucose 129 (*)     All other components within normal limits   POCT GLUCOSE - Normal   TSH WITHOUT REFLEX   T4   TROPONIN   TROPONIN   TROPONIN   TROPONIN   BRAIN NATRIURETIC PEPTIDE   BASIC METABOLIC PANEL W/ REFLEX TO MG FOR LOW K   MAGNESIUM   CBC   HEMOGLOBIN A1C   POCT GLUCOSE   POCT GLUCOSE       CT HEAD WO CONTRAST   Final Result   Negative CT brain with no acute intracranial abnormality. CT CERVICAL SPINE WO CONTRAST   Final Result   No acute abnormality of the cervical spine. CT CHEST W CONTRAST   Final Result   No acute pulmonary process. No abnormality corresponding to radiographic findings. XR CHEST STANDARD (2 VW)   Final Result   1. Mild right hilar prominence which is nonspecific and may relate to   underlying adenopathy, vascular prominence, airspace disease or possibly   mass. Consider further assessment with contrast-enhanced chest CT. 2. Left basilar/retrocardiac likely subsegmental atelectasis. 3. Slight elevation right hemidiaphragm             RECENT VITALS:     Temp: 99.6 °F (37.6 °C),  Pulse: 83, Resp: 15, BP: 132/73, SpO2: 95 %    This patient is a 54 y.o. Male with complaints of a syncopal episode.   Vital signs initially remarkable

## 2020-01-08 NOTE — PROGRESS NOTES
--  <20  --   --    TROPHS 8 8  --  11 10  --      Recent Labs     01/07/20  1814 01/07/20  2251 01/07/20  2310 01/08/20  0821 01/08/20  1110   PROT 7.8  --   --   --   --    LABALBU 4.7  --   --   --   --    LABA1C  --   --  6.6*  --   --    L6APKNH 8.6  --   --   --   --    TSH 2.07  --   --   --   --    AST 37  --   --   --   --    ALT 52*  --   --   --   --    ALKPHOS 110  --   --   --   --    BILITOT 0.29*  --   --   --   --    POCGLU  --  129*  --  124* 220*     ABG:  Lab Results   Component Value Date    PH 7.487 09/02/2011    PCO2 30.5 09/02/2011    PO2ART 70.0 09/02/2011    ARG9INN 22.8 09/02/2011    NBEA 0.2 09/02/2011    PBEA NOT REPORTED 09/02/2011    F4WLKDYT 92.4 09/02/2011    FIO2 NOT REPORTED 09/02/2011     No results found for: SPECIAL  No results found for: CULTURE    Radiology:  Xr Chest Standard (2 Vw)    Result Date: 1/7/2020  1. Mild right hilar prominence which is nonspecific and may relate to underlying adenopathy, vascular prominence, airspace disease or possibly mass. Consider further assessment with contrast-enhanced chest CT. 2. Left basilar/retrocardiac likely subsegmental atelectasis. 3. Slight elevation right hemidiaphragm     Ct Head Wo Contrast    Result Date: 1/7/2020  Negative CT brain with no acute intracranial abnormality. Ct Chest W Contrast    Result Date: 1/7/2020  No acute pulmonary process. No abnormality corresponding to radiographic findings. Ct Cervical Spine Wo Contrast    Result Date: 1/7/2020  No acute abnormality of the cervical spine.        Physical Examination:        General appearance:  alert, cooperative and no distress  Mental Status:  oriented to person, place and time and normal affect  Lungs:  clear to auscultation bilaterally, normal effort  Heart:  regular rate and rhythm, no murmur  Abdomen:  soft, nontender, nondistended, normal bowel sounds, no masses, hepatomegaly, splenomegaly  Extremities:  no edema, redness, tenderness in the calves  Skin: no gross lesions, rashes, induration    Assessment:        Hospital Problems           Last Modified POA    * (Principal) Hypertensive urgency 1/8/2020 Yes    Severe bipolar I disorder, most recent episode mixed, with psychotic features ([address] 1/8/2020 Yes    Syncope and collapse 1/8/2020 Yes          Plan:        Syncope pending echo CT scan of the head is negative probably related to hypertensive urgency CT scan of the lung negative for PE also will get carotid Doppler unlikely patient had seizure      Lower extremity swelling we will get Doppler of lower extremity and also will give Lasix probably acute diastolic CHF exacerbation secondary to hypertensive urgency          Rula Rdz MD  1/8/2020  11:27 AM

## 2020-01-08 NOTE — ED NOTES
Pt resting comfortably on stretcher. Respirations even and unlabored. No distress noted. Will cont to monitor.       Hosea Hayes RN  01/08/20 9043

## 2020-01-08 NOTE — PROGRESS NOTES
Transportation: Bus, Walk  Occupation: On disability  Cognition      Objective     AROM RLE (degrees)  RLE AROM: WNL  AROM LLE (degrees)  LLE AROM : WNL  AROM RUE (degrees)  RUE AROM : WNL  AROM LUE (degrees)  LUE AROM : WNL  Strength RLE  Strength RLE: WNL  Strength LLE  Strength LLE: WNL  Strength RUE  Strength RUE: WNL  Strength LUE  Strength LUE: WNL     Sensation  Overall Sensation Status: WFL  Bed mobility  Supine to Sit: Stand by assistance  Sit to Supine: Stand by assistance  Scooting: Stand by assistance  Transfers  Sit to Stand: Stand by assistance  Stand to sit: Stand by assistance  Ambulation  Ambulation?: Yes  Ambulation 1  Surface: level tile  Device: No Device  Assistance: Stand by assistance  Distance: amb 175 ft without a device x SBA     Balance  Posture: Good  Sitting - Static: Good  Sitting - Dynamic: Good  Standing - Static: Good  Standing - Dynamic: Good      Plan   Plan  Times per week: DC  PT  Safety Devices  Type of devices: Left in bed, Nurse notified, Call light within reach    G-Code    OutComes Score    AM-PAC Score  AM-PAC Inpatient Mobility Raw Score : 21 (01/08/20 1525)  AM-PAC Inpatient T-Scale Score : 50.25 (01/08/20 1525)  Mobility Inpatient CMS 0-100% Score: 28.97 (01/08/20 1525)  Mobility Inpatient CMS G-Code Modifier : CJ (01/08/20 1525)     Goals  Short term goals  Time Frame for Short term goals: No PT needs at this time    DC  PT    Therapy Time   Individual Concurrent Group Co-treatment   Time In 1305         Time Out 1330         Minutes [address] [facility], PT

## 2020-01-08 NOTE — ED NOTES
Pt resting comfortably on stretcher. Respirations even and unlabored. No distress noted. Will cont to monitor.       Mickie Tsai RN  01/08/20 5977

## 2020-01-08 NOTE — PROGRESS NOTES
Pain Intervention: Patient Satisfied    Oxygen Therapy  O2 Device: None (Room air)    Social/Functional History  Social/Functional History  Lives With: Other (comment)  Type of Home: Homeless(pt was at a shelter )  Home Equipment: (pt reported no use of DME At baseline )  ADL Assistance: [address]: Independent  Homemaking Responsibilities: Yes  Ambulation Assistance: Independent  Transfer Assistance: Independent  Active : No  Mode of Transportation: Bus, Walk  Occupation: On disability     Objective   Vision: Impaired  Vision Exceptions: Wears glasses at all times  Hearing: Within functional limits    Orientation  Overall Orientation Status: Within Functional Limits     Balance  Sitting Balance: Independent(~6 minutes on EOB )  Standing Balance: Supervision  Standing Balance  Time: ~4 minutes   Activity: pt completed functional mobility in hallway with PT and within hospital room  Comment: pt with no LOB and no reported dizziness      ADL  Feeding: Independent  Grooming: Independent  UE Bathing: Independent  LE Bathing: Modified independent   UE Dressing: Independent  LE Dressing: Modified independent   Toileting: Modified independent   Tone RUE  RUE Tone: Normotonic  Tone LUE  LUE Tone: Normotonic  Coordination  Movements Are Fluid And Coordinated: Yes     Bed mobility  Supine to Sit: Independent  Sit to Supine: Independent  Scooting: Independent  Transfers  Sit to stand: Supervision  Stand to sit: Supervision     Cognition  Overall Cognitive Status: Exceptions  Following Commands: Follows all commands without difficulty  Attention Span: Difficulty attending to directions; Attends with cues to redirect; Difficulty dividing attention  Safety Judgement: Decreased awareness of need for assistance  Cognition Comment: pt required frequent verbal cues for redirection throughout session     Sensation  Overall Sensation Status: WFL      LUE AROM : WFL  Left Hand AROM: WFL  RUE AROM : WFL  Right Hand AROM: WFL    LUE Strength  Gross LUE Strength: WFL  L Hand General: 4+/5  RUE Strength  Gross RUE Strength: WFL  R Hand General: 4+/5      Plan   Plan  Times per week: D/C OT     AM-PAC Score   AM-[facility] Inpatient Daily Activity Raw Score: 24 (01/08/20 1537)  AM-PAC Inpatient ADL T-Scale Score : 57.54 (01/08/20 1537)  ADL Inpatient CMS 0-100% Score: 0 (01/08/20 1537)  ADL Inpatient CMS G-Code Modifier : Psychiatric (01/08/20 1537)    Therapy Time   Individual Concurrent Group Co-treatment   Time In [address]         Time Out [address] [facility], OTR/L

## 2020-01-08 NOTE — ED NOTES
Pt resting comfortably on stretcher. Respirations even and unlabored. No distress noted. Will cont to monitor.       Fatmata Harrell RN  01/08/20 2702

## 2020-01-08 NOTE — PLAN OF CARE
Problem: Musculor/Skeletal Functional Status  Goal: Highest potential functional level  Outcome: Ongoing     Problem: Pain:  Goal: Pain level will decrease  Outcome: Ongoing  Goal: Control of acute pain  Outcome: Ongoing  Goal: Control of chronic pain  Outcome: Ongoing     Problem: Falls - Risk of:  Goal: Will remain free from falls  Outcome: Ongoing  Goal: Absence of physical injury  Outcome: Ongoing

## 2020-01-08 NOTE — ED PROVIDER NOTES
(NICODERM CQ) 21 MG/24HR 1 patch    enoxaparin (LOVENOX) injection 40 mg    acetaminophen (TYLENOL) tablet 650 mg    glucose (GLUTOSE) 40 % oral gel 15 g    dextrose 50 % IV solution    glucagon (rDNA) injection 1 mg    dextrose 5 % solution    insulin lispro (HUMALOG) injection vial 0-6 Units    insulin lispro (HUMALOG) injection vial 0-3 Units       RECENT VITALS:   BP: (!) 157/82  Pulse: 74  Resp: 15  Temp: 99.6 °F (37.6 °C) SpO2: 97 %    (Please note that portions of this note were completed with a voice recognition program.  Efforts were made to edit the dictations but occasionally words are mis-transcribed.)    MD Ba Rowland  Attending Emergency Medicine Physician        Ivonne Romero MD  01/08/20 8929

## 2020-01-09 VITALS
HEART RATE: 90 BPM | HEIGHT: 70 IN | RESPIRATION RATE: 16 BRPM | OXYGEN SATURATION: 98 % | DIASTOLIC BLOOD PRESSURE: 97 MMHG | BODY MASS INDEX: 35.79 KG/M2 | TEMPERATURE: 97.6 F | WEIGHT: 250 LBS | SYSTOLIC BLOOD PRESSURE: 154 MMHG

## 2020-01-09 LAB
GLUCOSE BLD-MCNC: 119 MG/DL (ref 75–110)
GLUCOSE BLD-MCNC: 125 MG/DL (ref 75–110)
GLUCOSE BLD-MCNC: 125 MG/DL (ref 75–110)
GLUCOSE BLD-MCNC: 151 MG/DL (ref 75–110)
LV EF: 52 %
LVEF MODALITY: NORMAL

## 2020-01-09 PROCEDURE — 6360000002 HC RX W HCPCS: Performed by: NURSE PRACTITIONER

## 2020-01-09 PROCEDURE — 93970 EXTREMITY STUDY: CPT

## 2020-01-09 PROCEDURE — 82947 ASSAY GLUCOSE BLOOD QUANT: CPT

## 2020-01-09 PROCEDURE — 93306 TTE W/DOPPLER COMPLETE: CPT

## 2020-01-09 PROCEDURE — 6370000000 HC RX 637 (ALT 250 FOR IP): Performed by: NURSE PRACTITIONER

## 2020-01-09 PROCEDURE — 99238 HOSP IP/OBS DSCHRG MGMT 30/<: CPT | Performed by: INTERNAL MEDICINE

## 2020-01-09 RX ORDER — LOSARTAN POTASSIUM 100 MG/1
100 TABLET ORAL DAILY
Qty: 30 TABLET | Refills: 0 | Status: SHIPPED | OUTPATIENT
Start: 2020-01-09

## 2020-01-09 RX ADMIN — CLONAZEPAM 0.5 MG: 1 TABLET ORAL at 08:25

## 2020-01-09 RX ADMIN — ENOXAPARIN SODIUM 40 MG: 40 INJECTION SUBCUTANEOUS at 08:25

## 2020-01-09 RX ADMIN — INSULIN LISPRO 1 UNITS: 100 INJECTION, SOLUTION INTRAVENOUS; SUBCUTANEOUS at 18:27

## 2020-01-09 RX ADMIN — LOSARTAN POTASSIUM 50 MG: 50 TABLET, FILM COATED ORAL at 08:26

## 2020-01-09 RX ADMIN — LITHIUM CARBONATE 600 MG: 300 TABLET, FILM COATED, EXTENDED RELEASE ORAL at 08:26

## 2020-01-09 RX ADMIN — GEMFIBROZIL 600 MG: 600 TABLET ORAL at 08:26

## 2020-01-09 RX ADMIN — LOVASTATIN 20 MG: 20 TABLET ORAL at 08:26

## 2020-01-09 NOTE — PLAN OF CARE
Problem: Musculor/Skeletal Functional Status  Goal: Highest potential functional level  Outcome: Ongoing     Problem: Pain:  Goal: Pain level will decrease  Description  Pain level will decrease  Outcome: Ongoing  Goal: Control of acute pain  Description  Control of acute pain  Outcome: Ongoing  Goal: Control of chronic pain  Description  Control of chronic pain  Outcome: Ongoing     Problem: Falls - Risk of:  Goal: Will remain free from falls  Description  Will remain free from falls  Outcome: Ongoing  Goal: Absence of physical injury  Description  Absence of physical injury  Outcome: Ongoing

## 2020-01-09 NOTE — CARE COORDINATION
Discharge [facility]  Written by: Antonieta Shaw RN    Patient Name: Arnulfo Clayton  Attending Provider: Shady Cutler MD  Admit Date: 2020  5:31 PM  MRN: [id number]  Account: [de-identified]                     : 1970  Discharge Date: 2020      Disposition: Return to rescue crisis. Spoke with [facility]. Will call when they have transport.      Antonieta Shaw RN

## 2020-01-09 NOTE — DISCHARGE INSTR - COC
STATUS:}    IV Access:  { MARIO IV ACCESS:933346016}    Nursing Mobility/ADLs:  Walking   {[facility] DME SWKM:492883601}  Transfer  {[facility] DME XWNR:843962259}  Bathing  {P DME XTSK:059756170}  Dressing  {P DME ZGSU:379413398}  Toileting  {P DME DYUA:143100762}  Feeding  {[facility] DME PZOA:362165571}  Med Admin  {[facility] DME ABHK:547291726}  Med Delivery   { MARIO MED Delivery:846386301}    Wound Care Documentation and Therapy:        Elimination:  Continence:   · Bowel: {YES / XM:70856}  · Bladder: {YES / VF:34825}  Urinary Catheter: {Urinary Catheter:010356511}   Colostomy/Ileostomy/Ileal Conduit: {YES / RE:68004}       Date of Last BM: ***    Intake/Output Summary (Last 24 hours) at 2020 1100  Last data filed at 2020 0735  Gross per 24 hour   Intake 480 ml   Output --   Net 480 ml     No intake/output data recorded.     Safety Concerns:     [address] Safety Concerns:734177409}    Impairments/Disabilities:      [address] Impairments/Disabilities:179928727}    Nutrition Therapy:  Current Nutrition Therapy:   [address] Diet List:887863040}    Routes of Feeding: {[facility] DME Other Feedings:176175829}  Liquids: {Slp liquid thickness:60319}  Daily Fluid Restriction: {[facility] DME Yes amt example:923378820}  Last Modified Barium Swallow with Video (Video Swallowing Test): {Done Not Done ZNET:434398085}    Treatments at the Time of Hospital Discharge:   Respiratory Treatments: ***  Oxygen Therapy:  {Therapy; copd oxygen:79770}  Ventilator:    {[facility] Vent KMRQ:812679551}    Rehab Therapies: {THERAPEUTIC INTERVENTION:3916516946}  Weight Bearing Status/Restrictions: [address] Weight [employer]in}  Other Medical Equipment (for information only, NOT a DME order):  {EQUIPMENT:218311484}  Other Treatments: ***    Patient's personal belongings (please select all that are sent with patient):  {[facility] DME Belongings:217385143}    RN SIGNATURE:  {Esignature:487591132}    CASE MANAGEMENT/SOCIAL WORK SECTION    Inpatient Status Date:

## 2020-01-11 ENCOUNTER — HOSPITAL ENCOUNTER (OUTPATIENT)
Age: 50
Setting detail: SPECIMEN
Discharge: HOME | End: 2020-01-11
Payer: MEDICARE

## 2020-01-11 LAB
LITHIUM DATE LAST DOSE: ABNORMAL
LITHIUM DOSE AMOUNT: ABNORMAL
LITHIUM DOSE TIME: ABNORMAL
LITHIUM LEVEL: 0.5 MMOL/L (ref 0.6–1.2)

## 2020-01-16 ENCOUNTER — HOSPITAL ENCOUNTER (OUTPATIENT)
Age: 50
Setting detail: SPECIMEN
Discharge: HOME | End: 2020-01-16
Payer: MEDICARE

## 2024-09-12 ENCOUNTER — HOSPITAL ENCOUNTER (INPATIENT)
Age: 54
LOS: 12 days | Discharge: HOME | DRG: 885 | End: 2024-09-24
Attending: EMERGENCY MEDICINE | Admitting: PSYCHIATRY & NEUROLOGY
Payer: COMMERCIAL

## 2024-09-12 DIAGNOSIS — F48.9 MENTAL HEALTH PROBLEM: Primary | ICD-10-CM

## 2024-09-12 DIAGNOSIS — F29 PSYCHOSIS, UNSPECIFIED PSYCHOSIS TYPE (HCC): ICD-10-CM

## 2024-09-12 PROBLEM — F23 ACUTE PSYCHOSIS (HCC): Status: RESOLVED | Noted: 2024-09-12 | Resolved: 2024-09-12

## 2024-09-12 PROBLEM — F23 ACUTE PSYCHOSIS (HCC): Status: ACTIVE | Noted: 2024-09-12

## 2024-09-12 PROBLEM — F31.2 BIPOLAR DISORDER, CURRENT EPISODE MANIC SEVERE WITH PSYCHOTIC FEATURES (HCC): Status: ACTIVE | Noted: 2024-09-10

## 2024-09-12 LAB
AMPHET UR QL SCN: NEGATIVE
ANION GAP SERPL CALCULATED.3IONS-SCNC: 14 MMOL/L (ref 9–17)
BACTERIA URNS QL MICRO: ABNORMAL
BARBITURATES UR QL SCN: NEGATIVE
BASOPHILS # BLD: 0.1 K/UL (ref 0–0.2)
BASOPHILS NFR BLD: 1 % (ref 0–2)
BENZODIAZ UR QL: NEGATIVE
BILIRUB UR QL STRIP: NEGATIVE
BUN SERPL-MCNC: 18 MG/DL (ref 6–20)
CALCIUM SERPL-MCNC: 9.7 MG/DL (ref 8.6–10.4)
CANNABINOIDS UR QL SCN: NEGATIVE
CASTS #/AREA URNS LPF: ABNORMAL /LPF
CHLORIDE SERPL-SCNC: 101 MMOL/L (ref 98–107)
CLARITY UR: CLEAR
CO2 SERPL-SCNC: 21 MMOL/L (ref 20–31)
COCAINE UR QL SCN: NEGATIVE
COLOR UR: YELLOW
CREAT SERPL-MCNC: 1.2 MG/DL (ref 0.7–1.2)
EOSINOPHIL # BLD: 0.2 K/UL (ref 0–0.4)
EOSINOPHILS RELATIVE PERCENT: 2 % (ref 0–4)
EPI CELLS #/AREA URNS HPF: ABNORMAL /HPF
ERYTHROCYTE [DISTWIDTH] IN BLOOD BY AUTOMATED COUNT: 13.8 % (ref 11.5–14.9)
ETHANOL PERCENT: <0.01 %
ETHANOLAMINE SERPL-MCNC: <10 MG/DL (ref 0–0.08)
FENTANYL UR QL: NEGATIVE
GFR, ESTIMATED: 73 ML/MIN/1.73M2
GLUCOSE SERPL-MCNC: 206 MG/DL (ref 70–99)
GLUCOSE UR STRIP-MCNC: NEGATIVE MG/DL
HCT VFR BLD AUTO: 39.9 % (ref 41–53)
HGB BLD-MCNC: 13.4 G/DL (ref 13.5–17.5)
HGB UR QL STRIP.AUTO: NEGATIVE
KETONES UR STRIP-MCNC: NEGATIVE MG/DL
LEUKOCYTE ESTERASE UR QL STRIP: NEGATIVE
LITHIUM DATE LAST DOSE: NORMAL
LITHIUM DOSE AMOUNT: NORMAL
LITHIUM DOSE TIME: NORMAL
LITHIUM LEVEL: 0.7 MMOL/L (ref 0.6–1.2)
LYMPHOCYTES NFR BLD: 2.5 K/UL (ref 1–4.8)
LYMPHOCYTES RELATIVE PERCENT: 23 % (ref 24–44)
MCH RBC QN AUTO: 29.5 PG (ref 26–34)
MCHC RBC AUTO-ENTMCNC: 33.6 G/DL (ref 31–37)
MCV RBC AUTO: 87.8 FL (ref 80–100)
METHADONE UR QL: NEGATIVE
MONOCYTES NFR BLD: 0.8 K/UL (ref 0.1–1.3)
MONOCYTES NFR BLD: 8 % (ref 1–7)
NEUTROPHILS NFR BLD: 66 % (ref 36–66)
NEUTS SEG NFR BLD: 7.3 K/UL (ref 1.3–9.1)
NITRITE UR QL STRIP: NEGATIVE
OPIATES UR QL SCN: NEGATIVE
OXYCODONE UR QL SCN: NEGATIVE
PCP UR QL SCN: NEGATIVE
PH UR STRIP: 5.5 [PH] (ref 5–8)
PLATELET # BLD AUTO: 392 K/UL (ref 150–450)
PMV BLD AUTO: 8.1 FL (ref 6–12)
POTASSIUM SERPL-SCNC: 4.1 MMOL/L (ref 3.7–5.3)
PROT UR STRIP-MCNC: ABNORMAL MG/DL
RBC # BLD AUTO: 4.54 M/UL (ref 4.5–5.9)
RBC #/AREA URNS HPF: ABNORMAL /HPF
SODIUM SERPL-SCNC: 136 MMOL/L (ref 135–144)
SP GR UR STRIP: 1.02 (ref 1–1.03)
TEST INFORMATION: NORMAL
UROBILINOGEN UR STRIP-ACNC: NORMAL EU/DL (ref 0–1)
WBC #/AREA URNS HPF: ABNORMAL /HPF
WBC OTHER # BLD: 10.9 K/UL (ref 3.5–11)

## 2024-09-12 PROCEDURE — 6370000000 HC RX 637 (ALT 250 FOR IP)

## 2024-09-12 PROCEDURE — 6360000002 HC RX W HCPCS: Performed by: EMERGENCY MEDICINE

## 2024-09-12 PROCEDURE — 99285 EMERGENCY DEPT VISIT HI MDM: CPT

## 2024-09-12 PROCEDURE — 2040000000 HC PSYCH ICU R&B

## 2024-09-12 PROCEDURE — G0480 DRUG TEST DEF 1-7 CLASSES: HCPCS

## 2024-09-12 PROCEDURE — 80048 BASIC METABOLIC PNL TOTAL CA: CPT

## 2024-09-12 PROCEDURE — 80178 ASSAY OF LITHIUM: CPT

## 2024-09-12 PROCEDURE — 36415 COLL VENOUS BLD VENIPUNCTURE: CPT

## 2024-09-12 PROCEDURE — 81001 URINALYSIS AUTO W/SCOPE: CPT

## 2024-09-12 PROCEDURE — 96372 THER/PROPH/DIAG INJ SC/IM: CPT

## 2024-09-12 PROCEDURE — 83036 HEMOGLOBIN GLYCOSYLATED A1C: CPT

## 2024-09-12 PROCEDURE — 85025 COMPLETE CBC W/AUTO DIFF WBC: CPT

## 2024-09-12 PROCEDURE — 80307 DRUG TEST PRSMV CHEM ANLYZR: CPT

## 2024-09-12 PROCEDURE — 99223 1ST HOSP IP/OBS HIGH 75: CPT

## 2024-09-12 RX ORDER — TRAMADOL HYDROCHLORIDE 50 MG/1
50 TABLET ORAL EVERY 6 HOURS PRN
Status: ON HOLD | COMMUNITY
Start: 2024-09-09 | End: 2024-09-24 | Stop reason: HOSPADM

## 2024-09-12 RX ORDER — MAGNESIUM HYDROXIDE/ALUMINUM HYDROXICE/SIMETHICONE 120; 1200; 1200 MG/30ML; MG/30ML; MG/30ML
30 SUSPENSION ORAL EVERY 6 HOURS PRN
Status: DISCONTINUED | OUTPATIENT
Start: 2024-09-12 | End: 2024-09-24 | Stop reason: HOSPADM

## 2024-09-12 RX ORDER — LORAZEPAM 1 MG/1
2 TABLET ORAL EVERY 6 HOURS PRN
Status: DISCONTINUED | OUTPATIENT
Start: 2024-09-12 | End: 2024-09-24 | Stop reason: HOSPADM

## 2024-09-12 RX ORDER — ATORVASTATIN CALCIUM 40 MG/1
40 TABLET, FILM COATED ORAL DAILY
COMMUNITY

## 2024-09-12 RX ORDER — ACETAMINOPHEN 325 MG/1
650 TABLET ORAL EVERY 4 HOURS PRN
Status: DISCONTINUED | OUTPATIENT
Start: 2024-09-12 | End: 2024-09-24 | Stop reason: HOSPADM

## 2024-09-12 RX ORDER — OLANZAPINE 10 MG/1
10 TABLET, ORALLY DISINTEGRATING ORAL NIGHTLY
Status: DISCONTINUED | OUTPATIENT
Start: 2024-09-12 | End: 2024-09-15

## 2024-09-12 RX ORDER — HALOPERIDOL 5 MG/ML
5 INJECTION INTRAMUSCULAR ONCE
Status: COMPLETED | OUTPATIENT
Start: 2024-09-12 | End: 2024-09-12

## 2024-09-12 RX ORDER — NICOTINE 21 MG/24HR
1 PATCH, TRANSDERMAL 24 HOURS TRANSDERMAL DAILY
Status: DISCONTINUED | OUTPATIENT
Start: 2024-09-12 | End: 2024-09-13

## 2024-09-12 RX ORDER — GABAPENTIN 300 MG/1
300 CAPSULE ORAL 3 TIMES DAILY
Status: ON HOLD | COMMUNITY
End: 2024-09-24 | Stop reason: HOSPADM

## 2024-09-12 RX ORDER — LORAZEPAM 2 MG/ML
2 INJECTION INTRAMUSCULAR EVERY 6 HOURS PRN
Status: DISCONTINUED | OUTPATIENT
Start: 2024-09-12 | End: 2024-09-24 | Stop reason: HOSPADM

## 2024-09-12 RX ORDER — HALOPERIDOL 5 MG/ML
5 INJECTION INTRAMUSCULAR EVERY 6 HOURS PRN
Status: DISCONTINUED | OUTPATIENT
Start: 2024-09-12 | End: 2024-09-24 | Stop reason: HOSPADM

## 2024-09-12 RX ORDER — DIPHENHYDRAMINE HYDROCHLORIDE 50 MG/ML
50 INJECTION INTRAMUSCULAR; INTRAVENOUS ONCE
Status: COMPLETED | OUTPATIENT
Start: 2024-09-12 | End: 2024-09-12

## 2024-09-12 RX ORDER — LANOLIN ALCOHOL/MO/W.PET/CERES
3 CREAM (GRAM) TOPICAL NIGHTLY
Status: DISCONTINUED | OUTPATIENT
Start: 2024-09-12 | End: 2024-09-24 | Stop reason: HOSPADM

## 2024-09-12 RX ORDER — LITHIUM CARBONATE 300 MG/1
900 CAPSULE ORAL NIGHTLY
Status: ON HOLD | COMMUNITY
End: 2024-09-24 | Stop reason: HOSPADM

## 2024-09-12 RX ORDER — TAMSULOSIN HYDROCHLORIDE 0.4 MG/1
0.4 CAPSULE ORAL DAILY
COMMUNITY

## 2024-09-12 RX ORDER — DIPHENHYDRAMINE HYDROCHLORIDE 50 MG/ML
50 INJECTION INTRAMUSCULAR; INTRAVENOUS EVERY 6 HOURS PRN
Status: DISCONTINUED | OUTPATIENT
Start: 2024-09-12 | End: 2024-09-24 | Stop reason: HOSPADM

## 2024-09-12 RX ORDER — POLYETHYLENE GLYCOL 3350 17 G/17G
17 POWDER, FOR SOLUTION ORAL DAILY PRN
Status: DISCONTINUED | OUTPATIENT
Start: 2024-09-12 | End: 2024-09-24 | Stop reason: HOSPADM

## 2024-09-12 RX ORDER — HALOPERIDOL 5 MG/1
5 TABLET ORAL EVERY 6 HOURS PRN
Status: DISCONTINUED | OUTPATIENT
Start: 2024-09-12 | End: 2024-09-24 | Stop reason: HOSPADM

## 2024-09-12 RX ORDER — HYDROXYZINE HYDROCHLORIDE 50 MG/1
50 TABLET, FILM COATED ORAL 3 TIMES DAILY PRN
Status: DISCONTINUED | OUTPATIENT
Start: 2024-09-12 | End: 2024-09-24 | Stop reason: HOSPADM

## 2024-09-12 RX ORDER — GLIPIZIDE 5 MG/1
5 TABLET ORAL
Status: ON HOLD | COMMUNITY
End: 2024-09-24 | Stop reason: HOSPADM

## 2024-09-12 RX ADMIN — DIPHENHYDRAMINE HYDROCHLORIDE 50 MG: 50 INJECTION INTRAMUSCULAR; INTRAVENOUS at 12:46

## 2024-09-12 RX ADMIN — ACETAMINOPHEN 650 MG: 325 TABLET ORAL at 18:24

## 2024-09-12 RX ADMIN — HALOPERIDOL LACTATE 5 MG: 5 INJECTION, SOLUTION INTRAMUSCULAR at 12:47

## 2024-09-12 ASSESSMENT — PAIN DESCRIPTION - PAIN TYPE: TYPE: NEUROPATHIC PAIN

## 2024-09-12 ASSESSMENT — PATIENT HEALTH QUESTIONNAIRE - PHQ9
SUM OF ALL RESPONSES TO PHQ QUESTIONS 1-9: 1
2. FEELING DOWN, DEPRESSED OR HOPELESS: SEVERAL DAYS
SUM OF ALL RESPONSES TO PHQ QUESTIONS 1-9: 1

## 2024-09-12 ASSESSMENT — PAIN - FUNCTIONAL ASSESSMENT
PAIN_FUNCTIONAL_ASSESSMENT: NONE - DENIES PAIN
PAIN_FUNCTIONAL_ASSESSMENT: ACTIVITIES ARE NOT PREVENTED

## 2024-09-12 ASSESSMENT — PAIN SCALES - GENERAL
PAINLEVEL_OUTOF10: 4
PAINLEVEL_OUTOF10: 5

## 2024-09-12 ASSESSMENT — PAIN DESCRIPTION - LOCATION
LOCATION: HEAD
LOCATION: FOOT

## 2024-09-12 ASSESSMENT — LIFESTYLE VARIABLES
HOW OFTEN DO YOU HAVE A DRINK CONTAINING ALCOHOL: NEVER
HOW MANY STANDARD DRINKS CONTAINING ALCOHOL DO YOU HAVE ON A TYPICAL DAY: PATIENT DOES NOT DRINK

## 2024-09-12 ASSESSMENT — SLEEP AND FATIGUE QUESTIONNAIRES
DO YOU USE A SLEEP AID: NO
DO YOU HAVE DIFFICULTY SLEEPING: YES
AVERAGE NUMBER OF SLEEP HOURS: 3
SLEEP PATTERN: DISTURBED/INTERRUPTED SLEEP

## 2024-09-12 ASSESSMENT — PAIN DESCRIPTION - ORIENTATION: ORIENTATION: LEFT;RIGHT

## 2024-09-13 PROBLEM — F48.9 MENTAL HEALTH PROBLEM: Status: ACTIVE | Noted: 2024-09-13

## 2024-09-13 LAB
ALBUMIN SERPL-MCNC: 4.4 G/DL (ref 3.5–5.2)
ALP SERPL-CCNC: 138 U/L (ref 40–129)
ALT SERPL-CCNC: 39 U/L (ref 5–41)
AST SERPL-CCNC: 39 U/L
BILIRUB DIRECT SERPL-MCNC: 0.1 MG/DL
BILIRUB INDIRECT SERPL-MCNC: 0.4 MG/DL (ref 0–1)
BILIRUB SERPL-MCNC: 0.5 MG/DL (ref 0.3–1.2)
CHOLEST SERPL-MCNC: 125 MG/DL
CHOLESTEROL/HDL RATIO: 4.2
EST. AVERAGE GLUCOSE BLD GHB EST-MCNC: 134 MG/DL
HBA1C MFR BLD: 6.3 % (ref 4–6)
HDLC SERPL-MCNC: 30 MG/DL
LDLC SERPL CALC-MCNC: 75 MG/DL (ref 0–130)
PROT SERPL-MCNC: 7.4 G/DL (ref 6.4–8.3)
TRIGL SERPL-MCNC: 101 MG/DL
TSH SERPL DL<=0.05 MIU/L-ACNC: 2.29 UIU/ML (ref 0.3–5)

## 2024-09-13 PROCEDURE — 6370000000 HC RX 637 (ALT 250 FOR IP): Performed by: INTERNAL MEDICINE

## 2024-09-13 PROCEDURE — 99223 1ST HOSP IP/OBS HIGH 75: CPT | Performed by: INTERNAL MEDICINE

## 2024-09-13 PROCEDURE — 6370000000 HC RX 637 (ALT 250 FOR IP)

## 2024-09-13 PROCEDURE — 84443 ASSAY THYROID STIM HORMONE: CPT

## 2024-09-13 PROCEDURE — 80076 HEPATIC FUNCTION PANEL: CPT

## 2024-09-13 PROCEDURE — APPSS30 APP SPLIT SHARED TIME 16-30 MINUTES

## 2024-09-13 PROCEDURE — 80061 LIPID PANEL: CPT

## 2024-09-13 PROCEDURE — 36415 COLL VENOUS BLD VENIPUNCTURE: CPT

## 2024-09-13 PROCEDURE — 99232 SBSQ HOSP IP/OBS MODERATE 35: CPT | Performed by: PSYCHIATRY & NEUROLOGY

## 2024-09-13 PROCEDURE — 2040000000 HC PSYCH ICU R&B

## 2024-09-13 RX ORDER — TAMSULOSIN HYDROCHLORIDE 0.4 MG/1
0.4 CAPSULE ORAL DAILY
Status: DISCONTINUED | OUTPATIENT
Start: 2024-09-13 | End: 2024-09-24 | Stop reason: HOSPADM

## 2024-09-13 RX ORDER — LOSARTAN POTASSIUM 50 MG/1
100 TABLET ORAL DAILY
Status: DISCONTINUED | OUTPATIENT
Start: 2024-09-13 | End: 2024-09-24 | Stop reason: HOSPADM

## 2024-09-13 RX ORDER — ASPIRIN 81 MG/1
81 TABLET ORAL DAILY
Status: DISCONTINUED | OUTPATIENT
Start: 2024-09-13 | End: 2024-09-24 | Stop reason: HOSPADM

## 2024-09-13 RX ORDER — LITHIUM CARBONATE 450 MG
450 TABLET, EXTENDED RELEASE ORAL EVERY 12 HOURS SCHEDULED
Status: DISCONTINUED | OUTPATIENT
Start: 2024-09-13 | End: 2024-09-24 | Stop reason: HOSPADM

## 2024-09-13 RX ORDER — ATORVASTATIN CALCIUM 20 MG/1
40 TABLET, FILM COATED ORAL DAILY
Status: DISCONTINUED | OUTPATIENT
Start: 2024-09-13 | End: 2024-09-24 | Stop reason: HOSPADM

## 2024-09-13 RX ORDER — GABAPENTIN 100 MG/1
100 CAPSULE ORAL 3 TIMES DAILY
Status: DISCONTINUED | OUTPATIENT
Start: 2024-09-13 | End: 2024-09-24 | Stop reason: HOSPADM

## 2024-09-13 RX ADMIN — ATORVASTATIN CALCIUM 40 MG: 20 TABLET, FILM COATED ORAL at 15:50

## 2024-09-13 RX ADMIN — ASPIRIN 81 MG: 81 TABLET, COATED ORAL at 15:50

## 2024-09-13 RX ADMIN — TAMSULOSIN HYDROCHLORIDE 0.4 MG: 0.4 CAPSULE ORAL at 15:50

## 2024-09-13 RX ADMIN — METFORMIN HYDROCHLORIDE 1000 MG: 500 TABLET ORAL at 15:50

## 2024-09-13 RX ADMIN — LITHIUM CARBONATE 450 MG: 450 TABLET, EXTENDED RELEASE ORAL at 10:10

## 2024-09-13 RX ADMIN — LITHIUM CARBONATE 450 MG: 450 TABLET, EXTENDED RELEASE ORAL at 20:53

## 2024-09-13 RX ADMIN — GABAPENTIN 100 MG: 100 CAPSULE ORAL at 20:53

## 2024-09-13 RX ADMIN — LOSARTAN POTASSIUM 100 MG: 50 TABLET, FILM COATED ORAL at 15:50

## 2024-09-13 ASSESSMENT — PAIN SCALES - GENERAL: PAINLEVEL_OUTOF10: 10

## 2024-09-14 PROCEDURE — APPSS30 APP SPLIT SHARED TIME 16-30 MINUTES

## 2024-09-14 PROCEDURE — 6360000002 HC RX W HCPCS

## 2024-09-14 PROCEDURE — 2040000000 HC PSYCH ICU R&B

## 2024-09-14 PROCEDURE — 6370000000 HC RX 637 (ALT 250 FOR IP): Performed by: INTERNAL MEDICINE

## 2024-09-14 PROCEDURE — 99232 SBSQ HOSP IP/OBS MODERATE 35: CPT | Performed by: INTERNAL MEDICINE

## 2024-09-14 PROCEDURE — 6370000000 HC RX 637 (ALT 250 FOR IP)

## 2024-09-14 PROCEDURE — 99232 SBSQ HOSP IP/OBS MODERATE 35: CPT | Performed by: PSYCHIATRY & NEUROLOGY

## 2024-09-14 RX ADMIN — GABAPENTIN 100 MG: 100 CAPSULE ORAL at 08:27

## 2024-09-14 RX ADMIN — GABAPENTIN 100 MG: 100 CAPSULE ORAL at 22:12

## 2024-09-14 RX ADMIN — METFORMIN HYDROCHLORIDE 1000 MG: 500 TABLET ORAL at 17:34

## 2024-09-14 RX ADMIN — DIPHENHYDRAMINE HYDROCHLORIDE 50 MG: 50 INJECTION INTRAMUSCULAR; INTRAVENOUS at 02:28

## 2024-09-14 RX ADMIN — ATORVASTATIN CALCIUM 40 MG: 20 TABLET, FILM COATED ORAL at 08:27

## 2024-09-14 RX ADMIN — HYDROXYZINE HYDROCHLORIDE 50 MG: 50 TABLET, FILM COATED ORAL at 23:26

## 2024-09-14 RX ADMIN — LITHIUM CARBONATE 450 MG: 450 TABLET, EXTENDED RELEASE ORAL at 22:12

## 2024-09-14 RX ADMIN — GABAPENTIN 100 MG: 100 CAPSULE ORAL at 14:46

## 2024-09-14 RX ADMIN — LORAZEPAM 2 MG: 2 INJECTION INTRAMUSCULAR; INTRAVENOUS at 02:28

## 2024-09-14 RX ADMIN — LOSARTAN POTASSIUM 100 MG: 50 TABLET, FILM COATED ORAL at 08:27

## 2024-09-14 RX ADMIN — ASPIRIN 81 MG: 81 TABLET, COATED ORAL at 08:27

## 2024-09-14 RX ADMIN — HALOPERIDOL LACTATE 5 MG: 5 INJECTION, SOLUTION INTRAMUSCULAR at 02:28

## 2024-09-14 RX ADMIN — Medication 3 MG: at 22:12

## 2024-09-14 RX ADMIN — METFORMIN HYDROCHLORIDE 1000 MG: 500 TABLET ORAL at 08:27

## 2024-09-14 RX ADMIN — LITHIUM CARBONATE 450 MG: 450 TABLET, EXTENDED RELEASE ORAL at 08:27

## 2024-09-14 RX ADMIN — TAMSULOSIN HYDROCHLORIDE 0.4 MG: 0.4 CAPSULE ORAL at 08:27

## 2024-09-14 ASSESSMENT — LIFESTYLE VARIABLES: HOW OFTEN DO YOU HAVE A DRINK CONTAINING ALCOHOL: NEVER

## 2024-09-15 PROCEDURE — 6370000000 HC RX 637 (ALT 250 FOR IP): Performed by: INTERNAL MEDICINE

## 2024-09-15 PROCEDURE — 99232 SBSQ HOSP IP/OBS MODERATE 35: CPT | Performed by: PSYCHIATRY & NEUROLOGY

## 2024-09-15 PROCEDURE — 6370000000 HC RX 637 (ALT 250 FOR IP)

## 2024-09-15 PROCEDURE — APPSS30 APP SPLIT SHARED TIME 16-30 MINUTES

## 2024-09-15 PROCEDURE — 2040000000 HC PSYCH ICU R&B

## 2024-09-15 RX ORDER — OLANZAPINE 10 MG/1
10 TABLET ORAL NIGHTLY
Status: DISCONTINUED | OUTPATIENT
Start: 2024-09-15 | End: 2024-09-17

## 2024-09-15 RX ADMIN — METFORMIN HYDROCHLORIDE 1000 MG: 500 TABLET ORAL at 09:42

## 2024-09-15 RX ADMIN — LITHIUM CARBONATE 450 MG: 450 TABLET, EXTENDED RELEASE ORAL at 20:52

## 2024-09-15 RX ADMIN — ASPIRIN 81 MG: 81 TABLET, COATED ORAL at 09:41

## 2024-09-15 RX ADMIN — LITHIUM CARBONATE 450 MG: 450 TABLET, EXTENDED RELEASE ORAL at 09:41

## 2024-09-15 RX ADMIN — GABAPENTIN 100 MG: 100 CAPSULE ORAL at 09:41

## 2024-09-15 RX ADMIN — LOSARTAN POTASSIUM 100 MG: 50 TABLET, FILM COATED ORAL at 09:41

## 2024-09-15 RX ADMIN — TAMSULOSIN HYDROCHLORIDE 0.4 MG: 0.4 CAPSULE ORAL at 09:42

## 2024-09-15 RX ADMIN — METFORMIN HYDROCHLORIDE 1000 MG: 500 TABLET ORAL at 17:26

## 2024-09-15 RX ADMIN — ACETAMINOPHEN 650 MG: 325 TABLET ORAL at 14:12

## 2024-09-15 RX ADMIN — ATORVASTATIN CALCIUM 40 MG: 20 TABLET, FILM COATED ORAL at 09:41

## 2024-09-15 RX ADMIN — GABAPENTIN 100 MG: 100 CAPSULE ORAL at 20:52

## 2024-09-15 RX ADMIN — GABAPENTIN 100 MG: 100 CAPSULE ORAL at 14:12

## 2024-09-15 RX ADMIN — Medication 3 MG: at 20:52

## 2024-09-15 RX ADMIN — ACETAMINOPHEN 650 MG: 325 TABLET ORAL at 05:34

## 2024-09-15 ASSESSMENT — PAIN SCALES - GENERAL: PAINLEVEL_OUTOF10: 10

## 2024-09-15 ASSESSMENT — PAIN DESCRIPTION - LOCATION: LOCATION: GENERALIZED

## 2024-09-16 LAB
FOLATE SERPL-MCNC: 15.5 NG/ML (ref 4.8–24.2)
VIT B12 SERPL-MCNC: 519 PG/ML (ref 232–1245)

## 2024-09-16 PROCEDURE — 6370000000 HC RX 637 (ALT 250 FOR IP)

## 2024-09-16 PROCEDURE — 99232 SBSQ HOSP IP/OBS MODERATE 35: CPT | Performed by: PSYCHIATRY & NEUROLOGY

## 2024-09-16 PROCEDURE — 6370000000 HC RX 637 (ALT 250 FOR IP): Performed by: INTERNAL MEDICINE

## 2024-09-16 PROCEDURE — 82607 VITAMIN B-12: CPT

## 2024-09-16 PROCEDURE — 2040000000 HC PSYCH ICU R&B

## 2024-09-16 PROCEDURE — 82746 ASSAY OF FOLIC ACID SERUM: CPT

## 2024-09-16 PROCEDURE — 36415 COLL VENOUS BLD VENIPUNCTURE: CPT

## 2024-09-16 PROCEDURE — APPSS30 APP SPLIT SHARED TIME 16-30 MINUTES: Performed by: NURSE PRACTITIONER

## 2024-09-16 RX ADMIN — ASPIRIN 81 MG: 81 TABLET, COATED ORAL at 08:20

## 2024-09-16 RX ADMIN — LITHIUM CARBONATE 450 MG: 450 TABLET, EXTENDED RELEASE ORAL at 08:21

## 2024-09-16 RX ADMIN — LITHIUM CARBONATE 450 MG: 450 TABLET, EXTENDED RELEASE ORAL at 21:34

## 2024-09-16 RX ADMIN — HYDROXYZINE HYDROCHLORIDE 50 MG: 50 TABLET, FILM COATED ORAL at 14:56

## 2024-09-16 RX ADMIN — ATORVASTATIN CALCIUM 40 MG: 20 TABLET, FILM COATED ORAL at 08:20

## 2024-09-16 RX ADMIN — ACETAMINOPHEN 650 MG: 325 TABLET ORAL at 20:27

## 2024-09-16 RX ADMIN — TAMSULOSIN HYDROCHLORIDE 0.4 MG: 0.4 CAPSULE ORAL at 08:20

## 2024-09-16 RX ADMIN — Medication 3 MG: at 21:34

## 2024-09-16 RX ADMIN — ACETAMINOPHEN 650 MG: 325 TABLET ORAL at 13:14

## 2024-09-16 RX ADMIN — METFORMIN HYDROCHLORIDE 1000 MG: 500 TABLET ORAL at 17:52

## 2024-09-16 RX ADMIN — METFORMIN HYDROCHLORIDE 1000 MG: 500 TABLET ORAL at 08:20

## 2024-09-16 RX ADMIN — ACETAMINOPHEN 650 MG: 325 TABLET ORAL at 05:15

## 2024-09-16 RX ADMIN — GABAPENTIN 100 MG: 100 CAPSULE ORAL at 13:14

## 2024-09-16 RX ADMIN — ALUMINUM HYDROXIDE, MAGNESIUM HYDROXIDE, AND SIMETHICONE 30 ML: 1200; 120; 1200 SUSPENSION ORAL at 01:47

## 2024-09-16 RX ADMIN — LOSARTAN POTASSIUM 100 MG: 50 TABLET, FILM COATED ORAL at 08:20

## 2024-09-16 RX ADMIN — GABAPENTIN 100 MG: 100 CAPSULE ORAL at 21:34

## 2024-09-16 RX ADMIN — GABAPENTIN 100 MG: 100 CAPSULE ORAL at 08:20

## 2024-09-16 ASSESSMENT — PAIN DESCRIPTION - LOCATION
LOCATION: HEAD
LOCATION: HEAD

## 2024-09-16 ASSESSMENT — PAIN DESCRIPTION - DESCRIPTORS: DESCRIPTORS: ACHING

## 2024-09-16 ASSESSMENT — PAIN SCALES - GENERAL
PAINLEVEL_OUTOF10: 6
PAINLEVEL_OUTOF10: 5
PAINLEVEL_OUTOF10: 3

## 2024-09-16 ASSESSMENT — PAIN - FUNCTIONAL ASSESSMENT
PAIN_FUNCTIONAL_ASSESSMENT: ACTIVITIES ARE NOT PREVENTED
PAIN_FUNCTIONAL_ASSESSMENT: 0-10

## 2024-09-17 PROCEDURE — 2040000000 HC PSYCH ICU R&B

## 2024-09-17 PROCEDURE — 6370000000 HC RX 637 (ALT 250 FOR IP)

## 2024-09-17 PROCEDURE — 99232 SBSQ HOSP IP/OBS MODERATE 35: CPT | Performed by: PSYCHIATRY & NEUROLOGY

## 2024-09-17 PROCEDURE — APPSS30 APP SPLIT SHARED TIME 16-30 MINUTES: Performed by: NURSE PRACTITIONER

## 2024-09-17 PROCEDURE — 6370000000 HC RX 637 (ALT 250 FOR IP): Performed by: INTERNAL MEDICINE

## 2024-09-17 PROCEDURE — 6370000000 HC RX 637 (ALT 250 FOR IP): Performed by: PSYCHIATRY & NEUROLOGY

## 2024-09-17 PROCEDURE — 93005 ELECTROCARDIOGRAM TRACING: CPT

## 2024-09-17 RX ORDER — PALIPERIDONE 3 MG/1
3 TABLET, EXTENDED RELEASE ORAL DAILY
Status: DISCONTINUED | OUTPATIENT
Start: 2024-09-17 | End: 2024-09-17

## 2024-09-17 RX ORDER — POLYVINYL ALCOHOL 14 MG/ML
1 SOLUTION/ DROPS OPHTHALMIC PRN
Status: DISCONTINUED | OUTPATIENT
Start: 2024-09-17 | End: 2024-09-24 | Stop reason: HOSPADM

## 2024-09-17 RX ORDER — LIDOCAINE 4 G/G
1 PATCH TOPICAL DAILY
Status: DISCONTINUED | OUTPATIENT
Start: 2024-09-17 | End: 2024-09-24 | Stop reason: HOSPADM

## 2024-09-17 RX ORDER — ARIPIPRAZOLE 15 MG/1
15 TABLET ORAL DAILY
Status: DISCONTINUED | OUTPATIENT
Start: 2024-09-17 | End: 2024-09-24 | Stop reason: HOSPADM

## 2024-09-17 RX ADMIN — ALUMINUM HYDROXIDE, MAGNESIUM HYDROXIDE, AND SIMETHICONE 30 ML: 1200; 120; 1200 SUSPENSION ORAL at 09:37

## 2024-09-17 RX ADMIN — ASPIRIN 81 MG: 81 TABLET, COATED ORAL at 07:52

## 2024-09-17 RX ADMIN — LITHIUM CARBONATE 450 MG: 450 TABLET, EXTENDED RELEASE ORAL at 21:37

## 2024-09-17 RX ADMIN — LITHIUM CARBONATE 450 MG: 450 TABLET, EXTENDED RELEASE ORAL at 07:52

## 2024-09-17 RX ADMIN — ATORVASTATIN CALCIUM 40 MG: 20 TABLET, FILM COATED ORAL at 07:52

## 2024-09-17 RX ADMIN — GABAPENTIN 100 MG: 100 CAPSULE ORAL at 21:37

## 2024-09-17 RX ADMIN — METFORMIN HYDROCHLORIDE 1000 MG: 500 TABLET ORAL at 16:13

## 2024-09-17 RX ADMIN — GABAPENTIN 100 MG: 100 CAPSULE ORAL at 14:01

## 2024-09-17 RX ADMIN — ACETAMINOPHEN 650 MG: 325 TABLET ORAL at 21:37

## 2024-09-17 RX ADMIN — ARIPIPRAZOLE 15 MG: 15 TABLET ORAL at 11:44

## 2024-09-17 RX ADMIN — TAMSULOSIN HYDROCHLORIDE 0.4 MG: 0.4 CAPSULE ORAL at 07:52

## 2024-09-17 RX ADMIN — GABAPENTIN 100 MG: 100 CAPSULE ORAL at 07:52

## 2024-09-17 RX ADMIN — LOSARTAN POTASSIUM 100 MG: 50 TABLET, FILM COATED ORAL at 07:52

## 2024-09-17 RX ADMIN — ACETAMINOPHEN 650 MG: 325 TABLET ORAL at 16:13

## 2024-09-17 RX ADMIN — ACETAMINOPHEN 650 MG: 325 TABLET ORAL at 06:25

## 2024-09-17 RX ADMIN — HYDROXYZINE HYDROCHLORIDE 50 MG: 50 TABLET, FILM COATED ORAL at 21:37

## 2024-09-17 RX ADMIN — Medication 3 MG: at 21:37

## 2024-09-17 RX ADMIN — METFORMIN HYDROCHLORIDE 1000 MG: 500 TABLET ORAL at 07:52

## 2024-09-17 ASSESSMENT — PAIN DESCRIPTION - LOCATION
LOCATION: HEAD
LOCATION: HEAD

## 2024-09-17 ASSESSMENT — PAIN SCALES - GENERAL
PAINLEVEL_OUTOF10: 6
PAINLEVEL_OUTOF10: 0
PAINLEVEL_OUTOF10: 0
PAINLEVEL_OUTOF10: 3

## 2024-09-17 ASSESSMENT — PAIN DESCRIPTION - DESCRIPTORS: DESCRIPTORS: ACHING

## 2024-09-17 ASSESSMENT — PAIN - FUNCTIONAL ASSESSMENT: PAIN_FUNCTIONAL_ASSESSMENT: ACTIVITIES ARE NOT PREVENTED

## 2024-09-18 LAB
EKG ATRIAL RATE: 74 BPM
EKG P AXIS: 60 DEGREES
EKG P-R INTERVAL: 174 MS
EKG Q-T INTERVAL: 412 MS
EKG QRS DURATION: 126 MS
EKG QTC CALCULATION (BAZETT): 457 MS
EKG R AXIS: -62 DEGREES
EKG T AXIS: 48 DEGREES
EKG VENTRICULAR RATE: 74 BPM

## 2024-09-18 PROCEDURE — 6370000000 HC RX 637 (ALT 250 FOR IP)

## 2024-09-18 PROCEDURE — 2040000000 HC PSYCH ICU R&B

## 2024-09-18 PROCEDURE — 6370000000 HC RX 637 (ALT 250 FOR IP): Performed by: INTERNAL MEDICINE

## 2024-09-18 PROCEDURE — APPSS30 APP SPLIT SHARED TIME 16-30 MINUTES: Performed by: NURSE PRACTITIONER

## 2024-09-18 PROCEDURE — 6370000000 HC RX 637 (ALT 250 FOR IP): Performed by: PSYCHIATRY & NEUROLOGY

## 2024-09-18 PROCEDURE — 99232 SBSQ HOSP IP/OBS MODERATE 35: CPT | Performed by: PSYCHIATRY & NEUROLOGY

## 2024-09-18 RX ADMIN — METFORMIN HYDROCHLORIDE 1000 MG: 500 TABLET ORAL at 16:30

## 2024-09-18 RX ADMIN — ACETAMINOPHEN 650 MG: 325 TABLET ORAL at 09:12

## 2024-09-18 RX ADMIN — ATORVASTATIN CALCIUM 40 MG: 20 TABLET, FILM COATED ORAL at 08:34

## 2024-09-18 RX ADMIN — ARIPIPRAZOLE 15 MG: 15 TABLET ORAL at 08:34

## 2024-09-18 RX ADMIN — ASPIRIN 81 MG: 81 TABLET, COATED ORAL at 08:34

## 2024-09-18 RX ADMIN — HYDROXYZINE HYDROCHLORIDE 50 MG: 50 TABLET, FILM COATED ORAL at 20:47

## 2024-09-18 RX ADMIN — GABAPENTIN 100 MG: 100 CAPSULE ORAL at 08:34

## 2024-09-18 RX ADMIN — POLYVINYL ALCOHOL 1 DROP: 1.4 SOLUTION/ DROPS OPHTHALMIC at 19:01

## 2024-09-18 RX ADMIN — HYDROXYZINE HYDROCHLORIDE 50 MG: 50 TABLET, FILM COATED ORAL at 14:09

## 2024-09-18 RX ADMIN — POLYVINYL ALCOHOL 1 DROP: 1.4 SOLUTION/ DROPS OPHTHALMIC at 20:26

## 2024-09-18 RX ADMIN — METFORMIN HYDROCHLORIDE 1000 MG: 500 TABLET ORAL at 08:35

## 2024-09-18 RX ADMIN — ALUMINUM HYDROXIDE, MAGNESIUM HYDROXIDE, AND SIMETHICONE 30 ML: 1200; 120; 1200 SUSPENSION ORAL at 19:55

## 2024-09-18 RX ADMIN — GABAPENTIN 100 MG: 100 CAPSULE ORAL at 14:09

## 2024-09-18 RX ADMIN — GABAPENTIN 100 MG: 100 CAPSULE ORAL at 20:47

## 2024-09-18 RX ADMIN — LITHIUM CARBONATE 450 MG: 450 TABLET, EXTENDED RELEASE ORAL at 20:47

## 2024-09-18 RX ADMIN — Medication 3 MG: at 20:47

## 2024-09-18 RX ADMIN — ACETAMINOPHEN 650 MG: 325 TABLET ORAL at 16:30

## 2024-09-18 RX ADMIN — TAMSULOSIN HYDROCHLORIDE 0.4 MG: 0.4 CAPSULE ORAL at 08:34

## 2024-09-18 RX ADMIN — LOSARTAN POTASSIUM 100 MG: 50 TABLET, FILM COATED ORAL at 08:34

## 2024-09-18 RX ADMIN — LITHIUM CARBONATE 450 MG: 450 TABLET, EXTENDED RELEASE ORAL at 08:34

## 2024-09-18 RX ADMIN — POLYVINYL ALCOHOL 1 DROP: 1.4 SOLUTION/ DROPS OPHTHALMIC at 11:20

## 2024-09-18 RX ADMIN — POLYVINYL ALCOHOL 1 DROP: 1.4 SOLUTION/ DROPS OPHTHALMIC at 08:35

## 2024-09-18 ASSESSMENT — PAIN SCALES - GENERAL
PAINLEVEL_OUTOF10: 5
PAINLEVEL_OUTOF10: 10
PAINLEVEL_OUTOF10: 5
PAINLEVEL_OUTOF10: 3
PAINLEVEL_OUTOF10: 8

## 2024-09-18 ASSESSMENT — PAIN - FUNCTIONAL ASSESSMENT: PAIN_FUNCTIONAL_ASSESSMENT: ACTIVITIES ARE NOT PREVENTED

## 2024-09-18 ASSESSMENT — PAIN DESCRIPTION - LOCATION
LOCATION: HEAD
LOCATION: GENERALIZED

## 2024-09-19 LAB
LITHIUM DATE LAST DOSE: NORMAL
LITHIUM DOSE AMOUNT: NORMAL
LITHIUM DOSE TIME: 2047
LITHIUM LEVEL: 0.6 MMOL/L (ref 0.6–1.2)

## 2024-09-19 PROCEDURE — 6370000000 HC RX 637 (ALT 250 FOR IP): Performed by: INTERNAL MEDICINE

## 2024-09-19 PROCEDURE — 1240000000 HC EMOTIONAL WELLNESS R&B

## 2024-09-19 PROCEDURE — 6370000000 HC RX 637 (ALT 250 FOR IP)

## 2024-09-19 PROCEDURE — 80178 ASSAY OF LITHIUM: CPT

## 2024-09-19 PROCEDURE — APPSS30 APP SPLIT SHARED TIME 16-30 MINUTES: Performed by: NURSE PRACTITIONER

## 2024-09-19 PROCEDURE — 6370000000 HC RX 637 (ALT 250 FOR IP): Performed by: PSYCHIATRY & NEUROLOGY

## 2024-09-19 PROCEDURE — 36415 COLL VENOUS BLD VENIPUNCTURE: CPT

## 2024-09-19 PROCEDURE — 99232 SBSQ HOSP IP/OBS MODERATE 35: CPT | Performed by: PSYCHIATRY & NEUROLOGY

## 2024-09-19 RX ADMIN — LOSARTAN POTASSIUM 100 MG: 50 TABLET, FILM COATED ORAL at 07:41

## 2024-09-19 RX ADMIN — ATORVASTATIN CALCIUM 40 MG: 20 TABLET, FILM COATED ORAL at 07:41

## 2024-09-19 RX ADMIN — GABAPENTIN 100 MG: 100 CAPSULE ORAL at 14:37

## 2024-09-19 RX ADMIN — ASPIRIN 81 MG: 81 TABLET, COATED ORAL at 07:42

## 2024-09-19 RX ADMIN — LITHIUM CARBONATE 450 MG: 450 TABLET, EXTENDED RELEASE ORAL at 07:41

## 2024-09-19 RX ADMIN — POLYVINYL ALCOHOL 1 DROP: 1.4 SOLUTION/ DROPS OPHTHALMIC at 03:50

## 2024-09-19 RX ADMIN — LITHIUM CARBONATE 450 MG: 450 TABLET, EXTENDED RELEASE ORAL at 21:44

## 2024-09-19 RX ADMIN — METFORMIN HYDROCHLORIDE 1000 MG: 500 TABLET ORAL at 16:53

## 2024-09-19 RX ADMIN — ARIPIPRAZOLE 15 MG: 15 TABLET ORAL at 07:41

## 2024-09-19 RX ADMIN — Medication 3 MG: at 21:43

## 2024-09-19 RX ADMIN — TAMSULOSIN HYDROCHLORIDE 0.4 MG: 0.4 CAPSULE ORAL at 07:41

## 2024-09-19 RX ADMIN — GABAPENTIN 100 MG: 100 CAPSULE ORAL at 07:41

## 2024-09-19 RX ADMIN — HYDROXYZINE HYDROCHLORIDE 50 MG: 50 TABLET, FILM COATED ORAL at 21:44

## 2024-09-19 RX ADMIN — METFORMIN HYDROCHLORIDE 1000 MG: 500 TABLET ORAL at 07:41

## 2024-09-19 RX ADMIN — ACETAMINOPHEN 650 MG: 325 TABLET ORAL at 07:42

## 2024-09-19 RX ADMIN — GABAPENTIN 100 MG: 100 CAPSULE ORAL at 21:43

## 2024-09-19 ASSESSMENT — PAIN DESCRIPTION - LOCATION: LOCATION: BACK

## 2024-09-19 ASSESSMENT — PAIN SCALES - GENERAL
PAINLEVEL_OUTOF10: 10
PAINLEVEL_OUTOF10: 10

## 2024-09-19 ASSESSMENT — PAIN - FUNCTIONAL ASSESSMENT: PAIN_FUNCTIONAL_ASSESSMENT: ACTIVITIES ARE NOT PREVENTED

## 2024-09-19 ASSESSMENT — PAIN DESCRIPTION - ORIENTATION: ORIENTATION: MID

## 2024-09-19 ASSESSMENT — PAIN SCALES - WONG BAKER: WONGBAKER_NUMERICALRESPONSE: NO HURT

## 2024-09-19 ASSESSMENT — PAIN DESCRIPTION - DESCRIPTORS: DESCRIPTORS: ACHING;DISCOMFORT

## 2024-09-20 PROCEDURE — 99232 SBSQ HOSP IP/OBS MODERATE 35: CPT | Performed by: PSYCHIATRY & NEUROLOGY

## 2024-09-20 PROCEDURE — 6370000000 HC RX 637 (ALT 250 FOR IP): Performed by: PSYCHIATRY & NEUROLOGY

## 2024-09-20 PROCEDURE — APPSS30 APP SPLIT SHARED TIME 16-30 MINUTES: Performed by: NURSE PRACTITIONER

## 2024-09-20 PROCEDURE — 6370000000 HC RX 637 (ALT 250 FOR IP): Performed by: INTERNAL MEDICINE

## 2024-09-20 PROCEDURE — 6370000000 HC RX 637 (ALT 250 FOR IP)

## 2024-09-20 PROCEDURE — 1240000000 HC EMOTIONAL WELLNESS R&B

## 2024-09-20 RX ADMIN — Medication 3 MG: at 21:53

## 2024-09-20 RX ADMIN — LITHIUM CARBONATE 450 MG: 450 TABLET, EXTENDED RELEASE ORAL at 09:15

## 2024-09-20 RX ADMIN — LOSARTAN POTASSIUM 100 MG: 50 TABLET, FILM COATED ORAL at 09:15

## 2024-09-20 RX ADMIN — ATORVASTATIN CALCIUM 40 MG: 20 TABLET, FILM COATED ORAL at 09:15

## 2024-09-20 RX ADMIN — GABAPENTIN 100 MG: 100 CAPSULE ORAL at 14:21

## 2024-09-20 RX ADMIN — ARIPIPRAZOLE 15 MG: 15 TABLET ORAL at 09:14

## 2024-09-20 RX ADMIN — ASPIRIN 81 MG: 81 TABLET, COATED ORAL at 09:14

## 2024-09-20 RX ADMIN — METFORMIN HYDROCHLORIDE 1000 MG: 500 TABLET ORAL at 09:15

## 2024-09-20 RX ADMIN — ACETAMINOPHEN 650 MG: 325 TABLET ORAL at 09:14

## 2024-09-20 RX ADMIN — GABAPENTIN 100 MG: 100 CAPSULE ORAL at 21:53

## 2024-09-20 RX ADMIN — LITHIUM CARBONATE 450 MG: 450 TABLET, EXTENDED RELEASE ORAL at 21:53

## 2024-09-20 RX ADMIN — METFORMIN HYDROCHLORIDE 1000 MG: 500 TABLET ORAL at 18:31

## 2024-09-20 RX ADMIN — POLYVINYL ALCOHOL 1 DROP: 1.4 SOLUTION/ DROPS OPHTHALMIC at 01:51

## 2024-09-20 RX ADMIN — GABAPENTIN 100 MG: 100 CAPSULE ORAL at 09:14

## 2024-09-20 RX ADMIN — TAMSULOSIN HYDROCHLORIDE 0.4 MG: 0.4 CAPSULE ORAL at 09:14

## 2024-09-20 RX ADMIN — HYDROXYZINE HYDROCHLORIDE 50 MG: 50 TABLET, FILM COATED ORAL at 21:53

## 2024-09-20 ASSESSMENT — PAIN SCALES - GENERAL: PAINLEVEL_OUTOF10: 3

## 2024-09-20 ASSESSMENT — PAIN DESCRIPTION - LOCATION: LOCATION: HEAD

## 2024-09-21 PROCEDURE — 99232 SBSQ HOSP IP/OBS MODERATE 35: CPT | Performed by: INTERNAL MEDICINE

## 2024-09-21 PROCEDURE — 6370000000 HC RX 637 (ALT 250 FOR IP): Performed by: INTERNAL MEDICINE

## 2024-09-21 PROCEDURE — 6370000000 HC RX 637 (ALT 250 FOR IP)

## 2024-09-21 PROCEDURE — 6370000000 HC RX 637 (ALT 250 FOR IP): Performed by: NURSE PRACTITIONER

## 2024-09-21 PROCEDURE — 1240000000 HC EMOTIONAL WELLNESS R&B

## 2024-09-21 PROCEDURE — APPSS30 APP SPLIT SHARED TIME 16-30 MINUTES: Performed by: NURSE PRACTITIONER

## 2024-09-21 PROCEDURE — 6370000000 HC RX 637 (ALT 250 FOR IP): Performed by: PSYCHIATRY & NEUROLOGY

## 2024-09-21 PROCEDURE — 99232 SBSQ HOSP IP/OBS MODERATE 35: CPT | Performed by: PSYCHIATRY & NEUROLOGY

## 2024-09-21 RX ADMIN — ACETAMINOPHEN 650 MG: 325 TABLET ORAL at 21:27

## 2024-09-21 RX ADMIN — LITHIUM CARBONATE 450 MG: 450 TABLET, EXTENDED RELEASE ORAL at 09:02

## 2024-09-21 RX ADMIN — GABAPENTIN 100 MG: 100 CAPSULE ORAL at 13:41

## 2024-09-21 RX ADMIN — ARIPIPRAZOLE 15 MG: 15 TABLET ORAL at 09:02

## 2024-09-21 RX ADMIN — METFORMIN HYDROCHLORIDE 1000 MG: 500 TABLET ORAL at 09:02

## 2024-09-21 RX ADMIN — ACETAMINOPHEN 650 MG: 325 TABLET ORAL at 09:02

## 2024-09-21 RX ADMIN — LITHIUM CARBONATE 450 MG: 450 TABLET, EXTENDED RELEASE ORAL at 21:47

## 2024-09-21 RX ADMIN — TAMSULOSIN HYDROCHLORIDE 0.4 MG: 0.4 CAPSULE ORAL at 09:02

## 2024-09-21 RX ADMIN — POLYVINYL ALCOHOL 1 DROP: 1.4 SOLUTION/ DROPS OPHTHALMIC at 09:46

## 2024-09-21 RX ADMIN — GABAPENTIN 100 MG: 100 CAPSULE ORAL at 21:47

## 2024-09-21 RX ADMIN — METFORMIN HYDROCHLORIDE 1000 MG: 500 TABLET ORAL at 17:32

## 2024-09-21 RX ADMIN — HYDROXYZINE HYDROCHLORIDE 50 MG: 50 TABLET, FILM COATED ORAL at 21:48

## 2024-09-21 RX ADMIN — ACETAMINOPHEN 650 MG: 325 TABLET ORAL at 13:41

## 2024-09-21 RX ADMIN — Medication 3 MG: at 21:47

## 2024-09-21 RX ADMIN — Medication 1 LOZENGE: at 07:15

## 2024-09-21 RX ADMIN — LOSARTAN POTASSIUM 100 MG: 50 TABLET, FILM COATED ORAL at 09:02

## 2024-09-21 RX ADMIN — ATORVASTATIN CALCIUM 40 MG: 20 TABLET, FILM COATED ORAL at 09:02

## 2024-09-21 RX ADMIN — DICLOFENAC SODIUM 2 G: 10 GEL TOPICAL at 17:51

## 2024-09-21 RX ADMIN — POLYVINYL ALCOHOL 1 DROP: 1.4 SOLUTION/ DROPS OPHTHALMIC at 19:41

## 2024-09-21 RX ADMIN — ASPIRIN 81 MG: 81 TABLET, COATED ORAL at 09:02

## 2024-09-21 RX ADMIN — GABAPENTIN 100 MG: 100 CAPSULE ORAL at 09:02

## 2024-09-21 ASSESSMENT — PAIN SCALES - WONG BAKER: WONGBAKER_NUMERICALRESPONSE: HURTS A LITTLE BIT

## 2024-09-21 ASSESSMENT — PAIN DESCRIPTION - ORIENTATION: ORIENTATION: MID

## 2024-09-21 ASSESSMENT — PAIN SCALES - GENERAL
PAINLEVEL_OUTOF10: 4
PAINLEVEL_OUTOF10: 4
PAINLEVEL_OUTOF10: 2
PAINLEVEL_OUTOF10: 9
PAINLEVEL_OUTOF10: 4

## 2024-09-21 ASSESSMENT — PAIN DESCRIPTION - LOCATION
LOCATION: HEAD
LOCATION: BACK

## 2024-09-22 PROCEDURE — 6370000000 HC RX 637 (ALT 250 FOR IP): Performed by: PSYCHIATRY & NEUROLOGY

## 2024-09-22 PROCEDURE — APPSS30 APP SPLIT SHARED TIME 16-30 MINUTES: Performed by: NURSE PRACTITIONER

## 2024-09-22 PROCEDURE — 1240000000 HC EMOTIONAL WELLNESS R&B

## 2024-09-22 PROCEDURE — 6370000000 HC RX 637 (ALT 250 FOR IP): Performed by: INTERNAL MEDICINE

## 2024-09-22 PROCEDURE — 6370000000 HC RX 637 (ALT 250 FOR IP)

## 2024-09-22 PROCEDURE — 6370000000 HC RX 637 (ALT 250 FOR IP): Performed by: NURSE PRACTITIONER

## 2024-09-22 PROCEDURE — 99232 SBSQ HOSP IP/OBS MODERATE 35: CPT | Performed by: PSYCHIATRY & NEUROLOGY

## 2024-09-22 RX ADMIN — GABAPENTIN 100 MG: 100 CAPSULE ORAL at 21:39

## 2024-09-22 RX ADMIN — Medication 1 LOZENGE: at 20:18

## 2024-09-22 RX ADMIN — DICLOFENAC SODIUM 2 G: 10 GEL TOPICAL at 21:39

## 2024-09-22 RX ADMIN — LITHIUM CARBONATE 450 MG: 450 TABLET, EXTENDED RELEASE ORAL at 08:22

## 2024-09-22 RX ADMIN — GABAPENTIN 100 MG: 100 CAPSULE ORAL at 14:39

## 2024-09-22 RX ADMIN — ASPIRIN 81 MG: 81 TABLET, COATED ORAL at 08:22

## 2024-09-22 RX ADMIN — Medication 1 LOZENGE: at 07:23

## 2024-09-22 RX ADMIN — DICLOFENAC SODIUM 2 G: 10 GEL TOPICAL at 10:57

## 2024-09-22 RX ADMIN — DICLOFENAC SODIUM 2 G: 10 GEL TOPICAL at 02:00

## 2024-09-22 RX ADMIN — LOSARTAN POTASSIUM 100 MG: 50 TABLET, FILM COATED ORAL at 08:22

## 2024-09-22 RX ADMIN — METFORMIN HYDROCHLORIDE 1000 MG: 500 TABLET ORAL at 08:22

## 2024-09-22 RX ADMIN — ATORVASTATIN CALCIUM 40 MG: 20 TABLET, FILM COATED ORAL at 08:22

## 2024-09-22 RX ADMIN — METFORMIN HYDROCHLORIDE 1000 MG: 500 TABLET ORAL at 17:45

## 2024-09-22 RX ADMIN — ARIPIPRAZOLE 15 MG: 15 TABLET ORAL at 08:22

## 2024-09-22 RX ADMIN — POLYVINYL ALCOHOL 1 DROP: 1.4 SOLUTION/ DROPS OPHTHALMIC at 21:39

## 2024-09-22 RX ADMIN — POLYVINYL ALCOHOL 1 DROP: 1.4 SOLUTION/ DROPS OPHTHALMIC at 05:10

## 2024-09-22 RX ADMIN — TAMSULOSIN HYDROCHLORIDE 0.4 MG: 0.4 CAPSULE ORAL at 08:22

## 2024-09-22 RX ADMIN — Medication 3 MG: at 21:39

## 2024-09-22 RX ADMIN — ACETAMINOPHEN 650 MG: 325 TABLET ORAL at 08:22

## 2024-09-22 RX ADMIN — LITHIUM CARBONATE 450 MG: 450 TABLET, EXTENDED RELEASE ORAL at 21:39

## 2024-09-22 RX ADMIN — GABAPENTIN 100 MG: 100 CAPSULE ORAL at 08:21

## 2024-09-22 ASSESSMENT — PAIN SCALES - GENERAL
PAINLEVEL_OUTOF10: 5
PAINLEVEL_OUTOF10: 3
PAINLEVEL_OUTOF10: 3

## 2024-09-22 ASSESSMENT — PAIN DESCRIPTION - ORIENTATION
ORIENTATION: LOWER
ORIENTATION: LOWER

## 2024-09-22 ASSESSMENT — PAIN DESCRIPTION - LOCATION
LOCATION: BACK
LOCATION: THROAT
LOCATION: BACK

## 2024-09-22 ASSESSMENT — PAIN DESCRIPTION - DESCRIPTORS
DESCRIPTORS: SORE
DESCRIPTORS: ACHING

## 2024-09-23 PROCEDURE — 6370000000 HC RX 637 (ALT 250 FOR IP): Performed by: INTERNAL MEDICINE

## 2024-09-23 PROCEDURE — 6370000000 HC RX 637 (ALT 250 FOR IP): Performed by: PSYCHIATRY & NEUROLOGY

## 2024-09-23 PROCEDURE — 99232 SBSQ HOSP IP/OBS MODERATE 35: CPT | Performed by: INTERNAL MEDICINE

## 2024-09-23 PROCEDURE — 6370000000 HC RX 637 (ALT 250 FOR IP): Performed by: NURSE PRACTITIONER

## 2024-09-23 PROCEDURE — 6370000000 HC RX 637 (ALT 250 FOR IP)

## 2024-09-23 PROCEDURE — 1240000000 HC EMOTIONAL WELLNESS R&B

## 2024-09-23 PROCEDURE — 99232 SBSQ HOSP IP/OBS MODERATE 35: CPT | Performed by: PSYCHIATRY & NEUROLOGY

## 2024-09-23 RX ORDER — AMLODIPINE BESYLATE 5 MG/1
2.5 TABLET ORAL DAILY
Status: DISCONTINUED | OUTPATIENT
Start: 2024-09-23 | End: 2024-09-24 | Stop reason: HOSPADM

## 2024-09-23 RX ADMIN — GABAPENTIN 100 MG: 100 CAPSULE ORAL at 13:29

## 2024-09-23 RX ADMIN — POLYVINYL ALCOHOL 1 DROP: 1.4 SOLUTION/ DROPS OPHTHALMIC at 18:01

## 2024-09-23 RX ADMIN — Medication 1 LOZENGE: at 20:44

## 2024-09-23 RX ADMIN — LITHIUM CARBONATE 450 MG: 450 TABLET, EXTENDED RELEASE ORAL at 07:40

## 2024-09-23 RX ADMIN — DICLOFENAC SODIUM 2 G: 10 GEL TOPICAL at 17:57

## 2024-09-23 RX ADMIN — GABAPENTIN 100 MG: 100 CAPSULE ORAL at 21:01

## 2024-09-23 RX ADMIN — ARIPIPRAZOLE 15 MG: 15 TABLET ORAL at 07:39

## 2024-09-23 RX ADMIN — Medication 1 LOZENGE: at 17:57

## 2024-09-23 RX ADMIN — METFORMIN HYDROCHLORIDE 1000 MG: 500 TABLET ORAL at 07:40

## 2024-09-23 RX ADMIN — POLYVINYL ALCOHOL 1 DROP: 1.4 SOLUTION/ DROPS OPHTHALMIC at 07:39

## 2024-09-23 RX ADMIN — ATORVASTATIN CALCIUM 40 MG: 20 TABLET, FILM COATED ORAL at 07:39

## 2024-09-23 RX ADMIN — TAMSULOSIN HYDROCHLORIDE 0.4 MG: 0.4 CAPSULE ORAL at 07:39

## 2024-09-23 RX ADMIN — LITHIUM CARBONATE 450 MG: 450 TABLET, EXTENDED RELEASE ORAL at 21:01

## 2024-09-23 RX ADMIN — Medication 3 MG: at 21:01

## 2024-09-23 RX ADMIN — ASPIRIN 81 MG: 81 TABLET, COATED ORAL at 07:39

## 2024-09-23 RX ADMIN — LOSARTAN POTASSIUM 100 MG: 50 TABLET, FILM COATED ORAL at 07:39

## 2024-09-23 RX ADMIN — GABAPENTIN 100 MG: 100 CAPSULE ORAL at 07:39

## 2024-09-23 RX ADMIN — METFORMIN HYDROCHLORIDE 1000 MG: 500 TABLET ORAL at 16:54

## 2024-09-23 RX ADMIN — ACETAMINOPHEN 650 MG: 325 TABLET ORAL at 18:30

## 2024-09-23 ASSESSMENT — PAIN SCALES - GENERAL
PAINLEVEL_OUTOF10: 0
PAINLEVEL_OUTOF10: 10
PAINLEVEL_OUTOF10: 2

## 2024-09-23 ASSESSMENT — PAIN DESCRIPTION - LOCATION
LOCATION: THROAT
LOCATION: HEAD

## 2024-09-23 ASSESSMENT — PAIN DESCRIPTION - DESCRIPTORS: DESCRIPTORS: SORE

## 2024-09-24 VITALS
OXYGEN SATURATION: 98 % | DIASTOLIC BLOOD PRESSURE: 95 MMHG | BODY MASS INDEX: 33.6 KG/M2 | RESPIRATION RATE: 16 BRPM | SYSTOLIC BLOOD PRESSURE: 134 MMHG | WEIGHT: 240 LBS | HEIGHT: 71 IN | TEMPERATURE: 98.5 F | HEART RATE: 85 BPM

## 2024-09-24 PROCEDURE — 6370000000 HC RX 637 (ALT 250 FOR IP): Performed by: PSYCHIATRY & NEUROLOGY

## 2024-09-24 PROCEDURE — 6370000000 HC RX 637 (ALT 250 FOR IP)

## 2024-09-24 PROCEDURE — 6370000000 HC RX 637 (ALT 250 FOR IP): Performed by: NURSE PRACTITIONER

## 2024-09-24 PROCEDURE — 6370000000 HC RX 637 (ALT 250 FOR IP): Performed by: INTERNAL MEDICINE

## 2024-09-24 PROCEDURE — 99239 HOSP IP/OBS DSCHRG MGMT >30: CPT | Performed by: PSYCHIATRY & NEUROLOGY

## 2024-09-24 RX ORDER — GABAPENTIN 100 MG/1
100 CAPSULE ORAL 3 TIMES DAILY
Qty: 90 CAPSULE | Refills: 0 | Status: SHIPPED | OUTPATIENT
Start: 2024-09-24 | End: 2024-10-24

## 2024-09-24 RX ORDER — LITHIUM CARBONATE 450 MG
450 TABLET, EXTENDED RELEASE ORAL EVERY 12 HOURS SCHEDULED
Qty: 60 TABLET | Refills: 0 | Status: SHIPPED | OUTPATIENT
Start: 2024-09-24

## 2024-09-24 RX ADMIN — ATORVASTATIN CALCIUM 40 MG: 20 TABLET, FILM COATED ORAL at 07:48

## 2024-09-24 RX ADMIN — AMLODIPINE BESYLATE 2.5 MG: 5 TABLET ORAL at 07:48

## 2024-09-24 RX ADMIN — METFORMIN HYDROCHLORIDE 1000 MG: 500 TABLET ORAL at 07:47

## 2024-09-24 RX ADMIN — TAMSULOSIN HYDROCHLORIDE 0.4 MG: 0.4 CAPSULE ORAL at 07:48

## 2024-09-24 RX ADMIN — LOSARTAN POTASSIUM 100 MG: 50 TABLET, FILM COATED ORAL at 07:48

## 2024-09-24 RX ADMIN — DICLOFENAC SODIUM 2 G: 10 GEL TOPICAL at 07:57

## 2024-09-24 RX ADMIN — ASPIRIN 81 MG: 81 TABLET, COATED ORAL at 07:47

## 2024-09-24 RX ADMIN — LITHIUM CARBONATE 450 MG: 450 TABLET, EXTENDED RELEASE ORAL at 07:47

## 2024-09-24 RX ADMIN — GABAPENTIN 100 MG: 100 CAPSULE ORAL at 07:47

## 2024-09-24 RX ADMIN — Medication 1 LOZENGE: at 04:12

## 2024-09-24 RX ADMIN — ARIPIPRAZOLE 15 MG: 15 TABLET ORAL at 07:47

## 2024-09-24 ASSESSMENT — PAIN SCALES - GENERAL
PAINLEVEL_OUTOF10: 2
PAINLEVEL_OUTOF10: 5

## 2024-09-24 ASSESSMENT — PAIN DESCRIPTION - DESCRIPTORS
DESCRIPTORS: ACHING
DESCRIPTORS: SORE

## 2024-09-24 ASSESSMENT — PAIN DESCRIPTION - LOCATION
LOCATION: THROAT
LOCATION: BACK

## 2024-09-24 ASSESSMENT — PAIN DESCRIPTION - ORIENTATION: ORIENTATION: MID
